# Patient Record
Sex: MALE | Race: OTHER | HISPANIC OR LATINO
[De-identification: names, ages, dates, MRNs, and addresses within clinical notes are randomized per-mention and may not be internally consistent; named-entity substitution may affect disease eponyms.]

---

## 2021-03-16 PROBLEM — Z00.00 ENCOUNTER FOR PREVENTIVE HEALTH EXAMINATION: Status: ACTIVE | Noted: 2021-03-16

## 2021-03-23 ENCOUNTER — APPOINTMENT (OUTPATIENT)
Dept: UROLOGY | Facility: CLINIC | Age: 71
End: 2021-03-23
Payer: MEDICARE

## 2021-03-23 VITALS
BODY MASS INDEX: 24.62 KG/M2 | HEIGHT: 70 IN | SYSTOLIC BLOOD PRESSURE: 116 MMHG | TEMPERATURE: 97.2 F | DIASTOLIC BLOOD PRESSURE: 82 MMHG | HEART RATE: 91 BPM | WEIGHT: 172 LBS

## 2021-03-23 PROCEDURE — 99203 OFFICE O/P NEW LOW 30 MIN: CPT

## 2021-03-23 RX ORDER — SENNOSIDES 8.6 MG TABLETS 8.6 MG/1
TABLET ORAL
Refills: 0 | Status: ACTIVE | COMMUNITY

## 2021-03-23 RX ORDER — ATORVASTATIN CALCIUM 40 MG/1
40 TABLET, FILM COATED ORAL
Refills: 0 | Status: ACTIVE | COMMUNITY

## 2021-03-23 RX ORDER — METFORMIN HYDROCHLORIDE 500 MG/1
500 TABLET, COATED ORAL
Refills: 0 | Status: ACTIVE | COMMUNITY

## 2021-03-23 RX ORDER — OMEPRAZOLE 20 MG/1
20 CAPSULE, DELAYED RELEASE ORAL
Refills: 0 | Status: ACTIVE | COMMUNITY

## 2021-03-23 RX ORDER — ASPIRIN 81 MG
81 TABLET,CHEWABLE ORAL
Refills: 0 | Status: ACTIVE | COMMUNITY

## 2021-03-23 RX ORDER — IBUPROFEN 600 MG/1
600 TABLET, FILM COATED ORAL
Refills: 0 | Status: ACTIVE | COMMUNITY

## 2021-03-23 NOTE — HISTORY OF PRESENT ILLNESS
[FreeTextEntry1] : This is a 70 year old male recently diagnosed with GS 7 prostate cancer as per the MRI report.  No pathology report available today.\par Diagnosed with GS 7 (3+4) with a PSA of 8.8.  Was diagnosed with CaP by Dr. Boyd at [facility] and then was going to have surgery with Dr. Cast.\par \par MRI with two PIRADs 4 lesion, left posterolateral midgland and right posteromedial midgland.  Right lesion abuts the capsule but with no EPE visualized\par No pelvic lymphadenopathy or bone lesions\par \par No urinary problkems\par \par \par Medical Hx: DM 2, GERD, High cholesterol\par Surgical Hx:hernia repair\par Social Hx: Former smoker, weekly ETOH use, \par Family Hx: No CaP history

## 2021-04-06 ENCOUNTER — RESULT REVIEW (OUTPATIENT)
Age: 71
End: 2021-04-06

## 2021-04-06 ENCOUNTER — OUTPATIENT (OUTPATIENT)
Dept: OUTPATIENT SERVICES | Facility: HOSPITAL | Age: 71
LOS: 1 days | End: 2021-04-06
Payer: MEDICARE

## 2021-04-06 DIAGNOSIS — C61 MALIGNANT NEOPLASM OF PROSTATE: ICD-10-CM

## 2021-04-06 PROCEDURE — 88321 CONSLTJ&REPRT SLD PREP ELSWR: CPT

## 2021-04-08 LAB — SURGICAL PATHOLOGY STUDY: SIGNIFICANT CHANGE UP

## 2021-04-13 ENCOUNTER — APPOINTMENT (OUTPATIENT)
Dept: UROLOGY | Facility: CLINIC | Age: 71
End: 2021-04-13
Payer: MEDICARE

## 2021-04-13 VITALS — DIASTOLIC BLOOD PRESSURE: 77 MMHG | SYSTOLIC BLOOD PRESSURE: 121 MMHG | TEMPERATURE: 98.2 F | OXYGEN SATURATION: 98 %

## 2021-04-13 PROCEDURE — 99214 OFFICE O/P EST MOD 30 MIN: CPT

## 2021-04-13 NOTE — HISTORY OF PRESENT ILLNESS
[FreeTextEntry1] : 70 year old male with recent diagnosis of prostate cancer. UNderwent prostate biopsy back in January 2021 for a PSA of 8.8.\par Was being followed at [facility] and was going to undergo Surgery with Dr. Cast but presented to us for a second opinion.\par \par MRI with two PIRADs 4 lesion, left posterolateral midgland and right posteromedial midgland. Right lesion abuts the capsule but with no EPE visualized\par No pelvic lymphadenopathy or bone lesions\par \par \par Slides reviewed by [facility] pathology \par GS 6 right medial base\par GS 7 right mid lateral\par S 7 right mid medial\par GS 6 Right Courtenay lateral\par GS 7 right apex medial\par

## 2021-04-13 NOTE — ASSESSMENT
[FreeTextEntry1] : GS 7\par \par Today we discussed the natural history of localized prostate cancer, and the heterogeneous biology of this malignancy.  We discussed the fact that many prostate cancers are slow growing and unlikely to metastasize or cause death, whereas others can be life threatening.  We reviewed risk stratification, staging, Sonia scoring, and PSA levels as they pertain to risk.  \par \par All treatment options were reviewed.  This included active surveillance, androgen deprivation, emerging options such as focal therapy/HIFU/cryotherapy, radiation options (including IMRT, SBRT, brachytherapy) and surgical options (open vs. robotic surgery, nerve vs. non-nerve sparing).  Oncologic outcomes were compared and contrasted.  Risks of biochemical and clinical recurrence discussed.  Risks of needing adjuvant or salvage treatments reviewed.  We discussed the risks of secondary malignancy after radiation.  We discussed the opportunity for pathological staging with surgery vs. other options.  We discussed the possibility of positive margins, treatment failure, cancer recurrence and cancer-related death even with treatment. \par \par All potential side effects of treatment were reviewed including, but not limited to: short term or permanent stress urinary incontinence and/or short term or permanent erectile dysfunction, penile shortening, rectal symptoms/pain, perineal pain, and other side effects. \par \par All potential complications of treatment and surgery were reviewed including, but not limited to: risks of conversion from MIS to open surgery discussed, bleeding//life-threatening hemorrhage, rectal injury requiring colostomy or delayed recognition leading to fistula, vascular/bowel/adjacent visceral organ injury, trocar/access injury, the possibility of recognized vs. unrecognized/delayed-recognition injury, risks of thermal/blunt/sharp/retraction injury, risks of DVT, PE, MI, death, risks of cardiopulmonary/anesthesia related complications, positional injury, infection/collection/abscess, wound complications/dehiscence/seroma/cellulitis, urinoma/fistula, ureteral injury/obstruction, bladder neck contracture, penile shortening, meatal stenosis, urethral stricture, lymphocele, obturator nerve injury, prolonged anastomotic leak, and other complications. \par \par \par \par \par \par \par \par

## 2021-05-07 VITALS
OXYGEN SATURATION: 98 % | WEIGHT: 175.71 LBS | RESPIRATION RATE: 16 BRPM | TEMPERATURE: 98 F | DIASTOLIC BLOOD PRESSURE: 72 MMHG | HEIGHT: 70 IN | SYSTOLIC BLOOD PRESSURE: 115 MMHG | HEART RATE: 72 BPM

## 2021-05-10 ENCOUNTER — RESULT REVIEW (OUTPATIENT)
Age: 71
End: 2021-05-10

## 2021-05-10 ENCOUNTER — APPOINTMENT (OUTPATIENT)
Dept: UROLOGY | Facility: HOSPITAL | Age: 71
End: 2021-05-10

## 2021-05-10 ENCOUNTER — INPATIENT (INPATIENT)
Facility: HOSPITAL | Age: 71
LOS: 0 days | Discharge: HOME | DRG: 708 | End: 2021-05-11
Attending: UROLOGY | Admitting: UROLOGY
Payer: MEDICARE

## 2021-05-10 DIAGNOSIS — C61 MALIGNANT NEOPLASM OF PROSTATE: ICD-10-CM

## 2021-05-10 DIAGNOSIS — E78.5 HYPERLIPIDEMIA, UNSPECIFIED: ICD-10-CM

## 2021-05-10 DIAGNOSIS — Z98.890 OTHER SPECIFIED POSTPROCEDURAL STATES: Chronic | ICD-10-CM

## 2021-05-10 DIAGNOSIS — R47.1 DYSARTHRIA AND ANARTHRIA: ICD-10-CM

## 2021-05-10 DIAGNOSIS — R29.810 FACIAL WEAKNESS: ICD-10-CM

## 2021-05-10 DIAGNOSIS — E11.9 TYPE 2 DIABETES MELLITUS WITHOUT COMPLICATIONS: ICD-10-CM

## 2021-05-10 LAB
ANION GAP SERPL CALC-SCNC: 8 MMOL/L — SIGNIFICANT CHANGE UP (ref 5–17)
BUN SERPL-MCNC: 11 MG/DL — SIGNIFICANT CHANGE UP (ref 7–23)
CALCIUM SERPL-MCNC: 8.8 MG/DL — SIGNIFICANT CHANGE UP (ref 8.4–10.5)
CHLORIDE SERPL-SCNC: 107 MMOL/L — SIGNIFICANT CHANGE UP (ref 96–108)
CO2 SERPL-SCNC: 24 MMOL/L — SIGNIFICANT CHANGE UP (ref 22–31)
CREAT SERPL-MCNC: 0.77 MG/DL — SIGNIFICANT CHANGE UP (ref 0.5–1.3)
GLUCOSE BLDC GLUCOMTR-MCNC: 136 MG/DL — HIGH (ref 70–99)
GLUCOSE BLDC GLUCOMTR-MCNC: 180 MG/DL — HIGH (ref 70–99)
GLUCOSE BLDC GLUCOMTR-MCNC: 184 MG/DL — HIGH (ref 70–99)
GLUCOSE BLDC GLUCOMTR-MCNC: 189 MG/DL — HIGH (ref 70–99)
GLUCOSE BLDC GLUCOMTR-MCNC: 214 MG/DL — HIGH (ref 70–99)
GLUCOSE SERPL-MCNC: 198 MG/DL — HIGH (ref 70–99)
HCT VFR BLD CALC: 30.5 % — LOW (ref 39–50)
HGB BLD-MCNC: 9.6 G/DL — LOW (ref 13–17)
MCHC RBC-ENTMCNC: 28.1 PG — SIGNIFICANT CHANGE UP (ref 27–34)
MCHC RBC-ENTMCNC: 31.5 GM/DL — LOW (ref 32–36)
MCV RBC AUTO: 89.2 FL — SIGNIFICANT CHANGE UP (ref 80–100)
NRBC # BLD: 0 /100 WBCS — SIGNIFICANT CHANGE UP (ref 0–0)
PLATELET # BLD AUTO: 198 K/UL — SIGNIFICANT CHANGE UP (ref 150–400)
POTASSIUM SERPL-MCNC: 5.1 MMOL/L — SIGNIFICANT CHANGE UP (ref 3.5–5.3)
POTASSIUM SERPL-SCNC: 5.1 MMOL/L — SIGNIFICANT CHANGE UP (ref 3.5–5.3)
RBC # BLD: 3.42 M/UL — LOW (ref 4.2–5.8)
RBC # FLD: 15.5 % — HIGH (ref 10.3–14.5)
SARS-COV-2 N GENE NPH QL NAA+PROBE: NOT DETECTED
SODIUM SERPL-SCNC: 139 MMOL/L — SIGNIFICANT CHANGE UP (ref 135–145)
WBC # BLD: 8.1 K/UL — SIGNIFICANT CHANGE UP (ref 3.8–10.5)
WBC # FLD AUTO: 8.1 K/UL — SIGNIFICANT CHANGE UP (ref 3.8–10.5)

## 2021-05-10 PROCEDURE — 88305 TISSUE EXAM BY PATHOLOGIST: CPT | Mod: 26

## 2021-05-10 PROCEDURE — 55866 LAPS SURG PRST8ECT RPBIC RAD: CPT

## 2021-05-10 PROCEDURE — 99222 1ST HOSP IP/OBS MODERATE 55: CPT

## 2021-05-10 PROCEDURE — 38571 LAPAROSCOPY LYMPHADENECTOMY: CPT

## 2021-05-10 PROCEDURE — 88309 TISSUE EXAM BY PATHOLOGIST: CPT | Mod: 26

## 2021-05-10 PROCEDURE — 70450 CT HEAD/BRAIN W/O DYE: CPT | Mod: 26,59

## 2021-05-10 PROCEDURE — 70498 CT ANGIOGRAPHY NECK: CPT | Mod: 26

## 2021-05-10 PROCEDURE — 0042T: CPT

## 2021-05-10 PROCEDURE — 70496 CT ANGIOGRAPHY HEAD: CPT | Mod: 26

## 2021-05-10 RX ORDER — GLUCAGON INJECTION, SOLUTION 0.5 MG/.1ML
1 INJECTION, SOLUTION SUBCUTANEOUS ONCE
Refills: 0 | Status: DISCONTINUED | OUTPATIENT
Start: 2021-05-10 | End: 2021-05-11

## 2021-05-10 RX ORDER — SODIUM CHLORIDE 9 MG/ML
1000 INJECTION INTRAMUSCULAR; INTRAVENOUS; SUBCUTANEOUS
Refills: 0 | Status: DISCONTINUED | OUTPATIENT
Start: 2021-05-10 | End: 2021-05-11

## 2021-05-10 RX ORDER — IBUPROFEN 200 MG
0 TABLET ORAL
Qty: 0 | Refills: 0 | DISCHARGE

## 2021-05-10 RX ORDER — ENOXAPARIN SODIUM 100 MG/ML
40 INJECTION SUBCUTANEOUS ONCE
Refills: 0 | Status: COMPLETED | OUTPATIENT
Start: 2021-05-10 | End: 2021-05-10

## 2021-05-10 RX ORDER — OXYCODONE HYDROCHLORIDE 5 MG/1
5 TABLET ORAL EVERY 6 HOURS
Refills: 0 | Status: DISCONTINUED | OUTPATIENT
Start: 2021-05-10 | End: 2021-05-11

## 2021-05-10 RX ORDER — SODIUM CHLORIDE 9 MG/ML
1000 INJECTION, SOLUTION INTRAVENOUS
Refills: 0 | Status: DISCONTINUED | OUTPATIENT
Start: 2021-05-10 | End: 2021-05-11

## 2021-05-10 RX ORDER — ACETAMINOPHEN 500 MG
1000 TABLET ORAL ONCE
Refills: 0 | Status: COMPLETED | OUTPATIENT
Start: 2021-05-10 | End: 2021-05-10

## 2021-05-10 RX ORDER — ENOXAPARIN SODIUM 100 MG/ML
40 INJECTION SUBCUTANEOUS EVERY 24 HOURS
Refills: 0 | Status: DISCONTINUED | OUTPATIENT
Start: 2021-05-11 | End: 2021-05-11

## 2021-05-10 RX ORDER — DEXTROSE 50 % IN WATER 50 %
25 SYRINGE (ML) INTRAVENOUS ONCE
Refills: 0 | Status: DISCONTINUED | OUTPATIENT
Start: 2021-05-10 | End: 2021-05-11

## 2021-05-10 RX ORDER — INSULIN LISPRO 100/ML
VIAL (ML) SUBCUTANEOUS
Refills: 0 | Status: DISCONTINUED | OUTPATIENT
Start: 2021-05-10 | End: 2021-05-11

## 2021-05-10 RX ORDER — DEXTROSE 50 % IN WATER 50 %
12.5 SYRINGE (ML) INTRAVENOUS ONCE
Refills: 0 | Status: DISCONTINUED | OUTPATIENT
Start: 2021-05-10 | End: 2021-05-11

## 2021-05-10 RX ORDER — GABAPENTIN 400 MG/1
600 CAPSULE ORAL ONCE
Refills: 0 | Status: COMPLETED | OUTPATIENT
Start: 2021-05-10 | End: 2021-05-10

## 2021-05-10 RX ORDER — SENNA PLUS 8.6 MG/1
2 TABLET ORAL AT BEDTIME
Refills: 0 | Status: DISCONTINUED | OUTPATIENT
Start: 2021-05-10 | End: 2021-05-11

## 2021-05-10 RX ORDER — BUPIVACAINE 13.3 MG/ML
20 INJECTION, SUSPENSION, LIPOSOMAL INFILTRATION ONCE
Refills: 0 | Status: DISCONTINUED | OUTPATIENT
Start: 2021-05-10 | End: 2021-05-11

## 2021-05-10 RX ORDER — ONDANSETRON 8 MG/1
4 TABLET, FILM COATED ORAL EVERY 6 HOURS
Refills: 0 | Status: DISCONTINUED | OUTPATIENT
Start: 2021-05-10 | End: 2021-05-11

## 2021-05-10 RX ORDER — DEXTROSE 50 % IN WATER 50 %
15 SYRINGE (ML) INTRAVENOUS ONCE
Refills: 0 | Status: DISCONTINUED | OUTPATIENT
Start: 2021-05-10 | End: 2021-05-11

## 2021-05-10 RX ORDER — CEFAZOLIN SODIUM 1 G
500 VIAL (EA) INJECTION EVERY 8 HOURS
Refills: 0 | Status: DISCONTINUED | OUTPATIENT
Start: 2021-05-10 | End: 2021-05-10

## 2021-05-10 RX ORDER — CEFAZOLIN SODIUM 1 G
2000 VIAL (EA) INJECTION EVERY 8 HOURS
Refills: 0 | Status: COMPLETED | OUTPATIENT
Start: 2021-05-10 | End: 2021-05-11

## 2021-05-10 RX ORDER — ACETAMINOPHEN 500 MG
650 TABLET ORAL EVERY 6 HOURS
Refills: 0 | Status: DISCONTINUED | OUTPATIENT
Start: 2021-05-10 | End: 2021-05-11

## 2021-05-10 RX ORDER — OXYBUTYNIN CHLORIDE 5 MG
5 TABLET ORAL EVERY 8 HOURS
Refills: 0 | Status: DISCONTINUED | OUTPATIENT
Start: 2021-05-10 | End: 2021-05-11

## 2021-05-10 RX ORDER — ATORVASTATIN CALCIUM 80 MG/1
40 TABLET, FILM COATED ORAL AT BEDTIME
Refills: 0 | Status: DISCONTINUED | OUTPATIENT
Start: 2021-05-10 | End: 2021-05-11

## 2021-05-10 RX ADMIN — OXYCODONE HYDROCHLORIDE 5 MILLIGRAM(S): 5 TABLET ORAL at 13:35

## 2021-05-10 RX ADMIN — ATORVASTATIN CALCIUM 40 MILLIGRAM(S): 80 TABLET, FILM COATED ORAL at 21:20

## 2021-05-10 RX ADMIN — ENOXAPARIN SODIUM 40 MILLIGRAM(S): 100 INJECTION SUBCUTANEOUS at 07:03

## 2021-05-10 RX ADMIN — Medication 5 MILLIGRAM(S): at 12:51

## 2021-05-10 RX ADMIN — SENNA PLUS 2 TABLET(S): 8.6 TABLET ORAL at 21:20

## 2021-05-10 RX ADMIN — Medication 5 MILLIGRAM(S): at 21:20

## 2021-05-10 RX ADMIN — Medication 650 MILLIGRAM(S): at 17:45

## 2021-05-10 RX ADMIN — Medication 2000 MILLIGRAM(S): at 16:30

## 2021-05-10 RX ADMIN — OXYCODONE HYDROCHLORIDE 5 MILLIGRAM(S): 5 TABLET ORAL at 22:20

## 2021-05-10 RX ADMIN — OXYCODONE HYDROCHLORIDE 5 MILLIGRAM(S): 5 TABLET ORAL at 21:20

## 2021-05-10 RX ADMIN — Medication 650 MILLIGRAM(S): at 18:30

## 2021-05-10 RX ADMIN — OXYCODONE HYDROCHLORIDE 5 MILLIGRAM(S): 5 TABLET ORAL at 13:19

## 2021-05-10 RX ADMIN — Medication 1: at 21:32

## 2021-05-10 RX ADMIN — Medication 1000 MILLIGRAM(S): at 07:02

## 2021-05-10 RX ADMIN — Medication 650 MILLIGRAM(S): at 23:07

## 2021-05-10 RX ADMIN — GABAPENTIN 600 MILLIGRAM(S): 400 CAPSULE ORAL at 07:03

## 2021-05-10 NOTE — CONSULT NOTE ADULT - ASSESSMENT
70y Male with PMHx of HLD, DM, prostate cancer, hernia s/p repair, and heartburn presented to [facility] for elective single port robotic radical prostatectomy. Stroke code called by floor RN for right facial droop and dysarthria. NIHSS 1 for dysarthria. Patient and outpatient Urology NP (saw pt prior to admission) confirmed patient is at baseline neuro exam. HCT showed no acute infarct or hemorrhage. CTA was negative for large vessel occlusion or stenosis. Patient with no clinical signs of stroke, at neurologic baseline.     - no need for further inpatient stroke workup or outpatient follow up  - Thank you for allowing us to participate in the care of this patient, please call Neurology with questions as needed.  70y Male with PMHx of HLD, DM, prostate cancer, hernia s/p repair, and heartburn presented to [facility] for elective single port robotic radical prostatectomy. Stroke code called by floor RN for right facial droop and dysarthria. NIHSS 1 for dysarthria. Patient and outpatient Urology NP (saw pt prior to admission) confirmed patient is at baseline neuro exam. HCT showed no acute infarct or hemorrhage. CTA was negative for large vessel occlusion or stenosis. Patient with no clinical signs of stroke, at neurologic baseline.     - no need for further inpatient stroke workup or outpatient follow up  - for primary stroke prevention can consider asa 81mg and start statin for LDL goal <100, follow up with PCP  - Thank you for allowing us to participate in the care of this patient, please call Neurology with questions as needed.  70y Male with PMHx of HLD, DM, prostate cancer, hernia s/p repair, and heartburn presented to [facility] for elective single port robotic radical prostatectomy. Stroke code called by floor RN for right facial droop and dysarthria. NIHSS 1 for dysarthria. Patient and outpatient Urology NP (saw pt prior to admission) confirmed patient is at baseline neuro exam. HCT showed no acute infarct or hemorrhage. CTA was negative for large vessel occlusion or stenosis. Patient with no clinical signs of stroke, at neurologic baseline.     - no need for further inpatient stroke workup or outpatient follow up  - for primary stroke prevention can consider asa 81mg and start statin for LDL goal <100, follow up with PCP (given mild-moderate atherosclerosis on CTA)  - Thank you for allowing us to participate in the care of this patient, please call Neurology with questions as needed.

## 2021-05-10 NOTE — PROGRESS NOTE ADULT - SUBJECTIVE AND OBJECTIVE BOX
UROLOGY POST OP NOTE (PAGER # [phone])    PROCEDURE: RALP    T(C): 37.2 (05-10-21 @ 16:19), Max: 37.3 (05-10-21 @ 14:05)  HR: 90 (05-10-21 @ 16:19) (66 - 90)  BP: 135/80 (05-10-21 @ 16:19) (135/80 - 174/95)  RR: 18 (05-10-21 @ 16:19) (14 - 19)  SpO2: 97% (05-10-21 @ 16:19) (93% - 100%)  Wt(kg): --  UO: adequate    SUBJECTIVE: c/o some bladder spasms. No N/V. No CP/SOB. Stroke code called after patient arrived on floor from pacu. Noted to have ?new R sided facial droop and dysarthria. CT head was unremarkable.    ON PE: alert and awake    Abdomen: soft, incision site clean and dry, NT    : FC intact, urine blood tinged                          9.6    8.10  )-----------( 198      ( 10 May 2021 12:43 )             30.5     05-10    139  |  107  |  11  ----------------------------<  198<H>  5.1   |  24  |  0.77    Ca    8.8      10 May 2021 12:43        < from: CT Angio Neck w/ IV Cont (05.10.21 @ 16:41) >    FINDINGS:    The aortic arch is normal size and shows patency, with normal 3 vessel configuration.    Both common carotid arteriesare patent up to the bifurcations.    The right internal carotid artery is patent up to the skull base, without hemodynamically significant stenosis or occlusion.    There is predominantly noncalcified plaque of the left carotid bifurcation and proximal left internal carotid artery resulting in mild (less than 50%) stenosis per NASCET criteria.    There is calcified plaque of the right vertebral artery origin resulting in moderate stenosis. There is contrast filling the remainder of the cervical right vertebral artery without significant stenosis. There is contrast filling of the cervical left vertebral artery without significant stenosis.    IMPRESSION: Mild stenosis of the proximal left internal carotid artery per NASCET criteria. Moderate stenosis of the right vertebral artery origin.      < end of copied text >  < from: CT Brain Stroke Protocol (05.10.21 @ 16:06) >  The ventricles and sulci are within normal limits for patient's stated age.    No evidence for intracranial hemorrhage, significant space occupying process or recent territorial infarction.  No acute extra-axial fluid collections are identified.    Gray-white matter differentiation is preserved.    The bones of thecalvarium are intact.    There is fluid within the paranasal sinuses which may be secondary to..    Impression:    No acute intracranial hemorrhage, mass effect or acute demarcated territorial infarction..      The last image was acquired on 5/10/2021 at 3:45 PM and the findings were discussed with CANDIDO Nguyen by Dr. Albino White     < end of copied text >

## 2021-05-10 NOTE — PROVIDER CONTACT NOTE (CHANGE IN STATUS NOTIFICATION) - SITUATION
Patient has visible right face drooping and slurred speech. Patient complaining of eye discomfort and cannot keep them open. Patient very lethargic - stroke code called

## 2021-05-10 NOTE — PROVIDER CONTACT NOTE (CHANGE IN STATUS NOTIFICATION) - BACKGROUND
Patient is s/p radical prostatectomy 5/10. Patient arrived on unit at 14:30 and had visible droop. Patient had quick neuro check and muscle strength testing at bedside. Patient stated droop is due to missing dentures to that side of his mouth. Dentures applied and droop resolved. 15:30 - nursing assistance patient would call but was confused when asked what was needed.  patient was assessed and was noted droop had returned. Patient continued to complain about eye discomfort and was unable to keep eyes open for long. Patient was able to follow directions but speech was slightly slurred.

## 2021-05-10 NOTE — PROVIDER CONTACT NOTE (OTHER) - ASSESSMENT
patient arterial line BP is 185/ 58. Patient c/o pain and sensation to urinate and urge to pass bowel movement

## 2021-05-10 NOTE — CONSULT NOTE ADULT - ATTENDING COMMENTS
Pt is at baseline per OP-NP, incidentally found atherosclerosis on CTA h/n:   -aspirin 81 daily, OP vascular risk factor mgt (goal BP<120/80 with ACEI+/-CCB’s, LDL bet 50 & 70, A1C<7.0)

## 2021-05-10 NOTE — PROVIDER CONTACT NOTE (CHANGE IN STATUS NOTIFICATION) - ASSESSMENT
Stroke code called. /90, HR 95, 98.3 TEMP, 95% ON ROOM air. Rectal temp. 99.4 blood glucose 214. PACU nurses arrived to unit per stroke team request to assess if patient has a change compared to when cared for in PACU. Per PACU nurses, patient did not have any droop.

## 2021-05-10 NOTE — CONSULT NOTE ADULT - SUBJECTIVE AND OBJECTIVE BOX
**STROKE CODE CONSULT NOTE**    Last known well time/Time of onset of symptoms: 1400 5/10/2021    HPI: 70y Male with PMHx of HLD, DM, prostate cancer, hernia s/p repair, and heartburn presented to [facility] for elective single port robotic radical prostatectomy using Kwan technique. Patient was seen by outpatient Urology NP pre-op. NP endorses patient was dysarthric and hard of hearing at baseline. Patient was first case of the day 7am and left OR around 12pm where he was recovered in the PACU. PACU RNs state patient was talking, moving, and able to drink water, with mild dysarthria at 2pm. Patient then transferred to floor around 2:30pm where floor RN noticed right facial droop and dysarthria, stroke code called. NIHSS 1 for dysarthria. Patient with normal variation of symmetry in face, confirmed by patient. Patient states that his voice sounds the same, just a little more hoarse. HCT showed no acute infarct or hemorrhage. CTA was negative for large vessel occlusion or stenosis. Outpatient Urology NP was able to come and see the patient and endorsed patient's face and speech were at baseline. Patient able to ambulate well on exam, MRS was previous 0. Patient denies acute onset of numbness, weakness, tingling, slurred speech, blurry vision, double vision, dizziness, headache.     T(C): 37.2 (05-10-21 @ 16:19), Max: 37.3 (05-10-21 @ 14:05)  HR: 90 (05-10-21 @ 16:19) (66 - 90)  BP: 135/80 (05-10-21 @ 16:19) (135/80 - 174/95)  RR: 18 (05-10-21 @ 16:19) (14 - 19)  SpO2: 97% (05-10-21 @ 16:19) (93% - 100%)    PAST MEDICAL & SURGICAL HISTORY:  DM (diabetes mellitus)    Hyperlipidemia    Prostate cancer    Hernia    Heartburn    History of hernia repair        FAMILY HISTORY:      SOCIAL HISTORY:      MEDICATIONS  (STANDING):  acetaminophen   Tablet .. 650 milliGRAM(s) Oral every 6 hours  atorvastatin 40 milliGRAM(s) Oral at bedtime  BUpivacaine liposome 1.3% Injectable (no eMAR) 20 milliLiter(s) Local Injection once  ceFAZolin  Injectable. 2000 milliGRAM(s) IV Push every 8 hours  dextrose 40% Gel 15 Gram(s) Oral once  dextrose 5%. 1000 milliLiter(s) (50 mL/Hr) IV Continuous <Continuous>  dextrose 5%. 1000 milliLiter(s) (100 mL/Hr) IV Continuous <Continuous>  dextrose 50% Injectable 25 Gram(s) IV Push once  dextrose 50% Injectable 12.5 Gram(s) IV Push once  dextrose 50% Injectable 25 Gram(s) IV Push once  glucagon  Injectable 1 milliGRAM(s) IntraMuscular once  insulin lispro (ADMELOG) corrective regimen sliding scale   SubCutaneous Before meals and at bedtime  sodium chloride 0.9%. 1000 milliLiter(s) (120 mL/Hr) IV Continuous <Continuous>    MEDICATIONS  (PRN):  ondansetron Injectable 4 milliGRAM(s) IV Push every 6 hours PRN Nausea  oxybutynin 5 milliGRAM(s) Oral every 8 hours PRN Bladder spasms  oxyCODONE    IR 5 milliGRAM(s) Oral every 6 hours PRN Severe Pain (7 - 10)    Allergies    No Known Allergies    Intolerances      Vital Signs Last 24 Hrs  T(C): 37.2 (10 May 2021 16:19), Max: 37.3 (10 May 2021 14:05)  T(F): 98.9 (10 May 2021 16:19), Max: 99.1 (10 May 2021 14:05)  HR: 90 (10 May 2021 16:19) (66 - 90)  BP: 135/80 (10 May 2021 16:19) (135/80 - 174/95)  BP(mean): 119 (10 May 2021 14:05) (105 - 128)  RR: 18 (10 May 2021 16:19) (14 - 19)  SpO2: 97% (10 May 2021 16:19) (93% - 100%)    Physical exam:  Constitutional: No acute distress, conversant  Neurologic:  -Mental status: Awake, alert, oriented to person, place, and time. Speech is fluent with intact naming, repetition, and comprehension, mild dysarthria. Recent and remote memory intact. Follows commands. Attention/concentration intact. Fund of knowledge appropriate.  -Cranial nerves:   II: Visual fields are full to confrontation.  III, IV, VI: Extraocular movements are intact without nystagmus. Pupils equally round and reactive to light  V:  Facial sensation V1-V3 equal and intact   VII: Face is symmetric with normal eye closure and smile  V111: Hard of hearing bilaterally  Motor: Normal bulk and tone. No pronator drift. Strength bilateral upper extremity 5/5, bilateral lower extremities 5/5.  Sensation: Intact to light touch bilaterally. No neglect or extinction on double simultaneous testing.  Coordination: No dysmetria on finger-to-nose and heel-to-shin bilaterally  Reflexes: Downgoing toes bilaterally   Gait: Narrow gait and steady    NIHSS: 1 ASPECT Score: 10    Fingerstick Blood Glucose: CAPILLARY BLOOD GLUCOSE      POCT Blood Glucose.: 214 mg/dL (10 May 2021 15:26)    LABS:                        9.6    8.10  )-----------( 198      ( 10 May 2021 12:43 )             30.5     05-10    139  |  107  |  11  ----------------------------<  198<H>  5.1   |  24  |  0.77    Ca    8.8      10 May 2021 12:43                RADIOLOGY & ADDITIONAL STUDIES:  < from: CT Angio Head w/ IV Cont (05.10.21 @ 16:41) >  IMPRESSION: No large vessel occlusion. Diminutive caliber of the basilar artery with prominent bilateral posterior communicating artery supplying the posterior cerebral arteries which may be on a congenital basis.    < from: CT Angio Head w/ IV Cont (05.10.21 @ 16:41) >  IMPRESSION: Mild stenosis of the proximal left internal carotid artery per NASCET criteria. Moderate stenosis of the right vertebral artery origin.    < from: CT Brain Stroke Protocol (05.10.21 @ 16:06) >  Impression:    No acute intracranial hemorrhage, mass effect or acute demarcated territorial infarction..    < end of copied text >      -----------------------------------------------------------------------------------------------------------------  IV-tPA (Y/N):   no, patient at baseline neuro symptoms

## 2021-05-11 ENCOUNTER — TRANSCRIPTION ENCOUNTER (OUTPATIENT)
Age: 71
End: 2021-05-11

## 2021-05-11 VITALS
DIASTOLIC BLOOD PRESSURE: 67 MMHG | HEART RATE: 63 BPM | OXYGEN SATURATION: 97 % | RESPIRATION RATE: 17 BRPM | SYSTOLIC BLOOD PRESSURE: 107 MMHG | TEMPERATURE: 98 F

## 2021-05-11 DIAGNOSIS — E78.5 HYPERLIPIDEMIA, UNSPECIFIED: ICD-10-CM

## 2021-05-11 DIAGNOSIS — E11.9 TYPE 2 DIABETES MELLITUS WITHOUT COMPLICATIONS: ICD-10-CM

## 2021-05-11 LAB
A1C WITH ESTIMATED AVERAGE GLUCOSE RESULT: 7.5 % — HIGH (ref 4–5.6)
ANION GAP SERPL CALC-SCNC: 8 MMOL/L — SIGNIFICANT CHANGE UP (ref 5–17)
BUN SERPL-MCNC: 10 MG/DL — SIGNIFICANT CHANGE UP (ref 7–23)
CALCIUM SERPL-MCNC: 8.2 MG/DL — LOW (ref 8.4–10.5)
CHLORIDE SERPL-SCNC: 103 MMOL/L — SIGNIFICANT CHANGE UP (ref 96–108)
CO2 SERPL-SCNC: 29 MMOL/L — SIGNIFICANT CHANGE UP (ref 22–31)
COVID-19 SPIKE DOMAIN AB INTERP: NEGATIVE — SIGNIFICANT CHANGE UP
COVID-19 SPIKE DOMAIN ANTIBODY RESULT: 0.4 U/ML — SIGNIFICANT CHANGE UP
CREAT SERPL-MCNC: 0.89 MG/DL — SIGNIFICANT CHANGE UP (ref 0.5–1.3)
ESTIMATED AVERAGE GLUCOSE: 169 MG/DL — HIGH (ref 68–114)
GLUCOSE BLDC GLUCOMTR-MCNC: 151 MG/DL — HIGH (ref 70–99)
GLUCOSE BLDC GLUCOMTR-MCNC: 214 MG/DL — HIGH (ref 70–99)
GLUCOSE BLDC GLUCOMTR-MCNC: 214 MG/DL — HIGH (ref 70–99)
GLUCOSE SERPL-MCNC: 139 MG/DL — HIGH (ref 70–99)
HCT VFR BLD CALC: 27.4 % — LOW (ref 39–50)
HGB BLD-MCNC: 8.4 G/DL — LOW (ref 13–17)
MAGNESIUM SERPL-MCNC: 1.9 MG/DL — SIGNIFICANT CHANGE UP (ref 1.6–2.6)
MCHC RBC-ENTMCNC: 27.7 PG — SIGNIFICANT CHANGE UP (ref 27–34)
MCHC RBC-ENTMCNC: 30.7 GM/DL — LOW (ref 32–36)
MCV RBC AUTO: 90.4 FL — SIGNIFICANT CHANGE UP (ref 80–100)
NRBC # BLD: 0 /100 WBCS — SIGNIFICANT CHANGE UP (ref 0–0)
PHOSPHATE SERPL-MCNC: 3.2 MG/DL — SIGNIFICANT CHANGE UP (ref 2.5–4.5)
PLATELET # BLD AUTO: 190 K/UL — SIGNIFICANT CHANGE UP (ref 150–400)
POTASSIUM SERPL-MCNC: 4 MMOL/L — SIGNIFICANT CHANGE UP (ref 3.5–5.3)
POTASSIUM SERPL-SCNC: 4 MMOL/L — SIGNIFICANT CHANGE UP (ref 3.5–5.3)
RBC # BLD: 3.03 M/UL — LOW (ref 4.2–5.8)
RBC # FLD: 16 % — HIGH (ref 10.3–14.5)
SARS-COV-2 IGG+IGM SERPL QL IA: 0.4 U/ML — SIGNIFICANT CHANGE UP
SARS-COV-2 IGG+IGM SERPL QL IA: NEGATIVE — SIGNIFICANT CHANGE UP
SODIUM SERPL-SCNC: 140 MMOL/L — SIGNIFICANT CHANGE UP (ref 135–145)
WBC # BLD: 6.95 K/UL — SIGNIFICANT CHANGE UP (ref 3.8–10.5)
WBC # FLD AUTO: 6.95 K/UL — SIGNIFICANT CHANGE UP (ref 3.8–10.5)

## 2021-05-11 PROCEDURE — 82962 GLUCOSE BLOOD TEST: CPT

## 2021-05-11 PROCEDURE — 86901 BLOOD TYPING SEROLOGIC RH(D): CPT

## 2021-05-11 PROCEDURE — 86769 SARS-COV-2 COVID-19 ANTIBODY: CPT

## 2021-05-11 PROCEDURE — 85027 COMPLETE CBC AUTOMATED: CPT

## 2021-05-11 PROCEDURE — 88305 TISSUE EXAM BY PATHOLOGIST: CPT

## 2021-05-11 PROCEDURE — 86850 RBC ANTIBODY SCREEN: CPT

## 2021-05-11 PROCEDURE — S2900: CPT

## 2021-05-11 PROCEDURE — 83735 ASSAY OF MAGNESIUM: CPT

## 2021-05-11 PROCEDURE — 88309 TISSUE EXAM BY PATHOLOGIST: CPT

## 2021-05-11 PROCEDURE — 36415 COLL VENOUS BLD VENIPUNCTURE: CPT

## 2021-05-11 PROCEDURE — 80048 BASIC METABOLIC PNL TOTAL CA: CPT

## 2021-05-11 PROCEDURE — 0042T: CPT

## 2021-05-11 PROCEDURE — C1889: CPT

## 2021-05-11 PROCEDURE — C9399: CPT

## 2021-05-11 PROCEDURE — 83036 HEMOGLOBIN GLYCOSYLATED A1C: CPT

## 2021-05-11 PROCEDURE — 70450 CT HEAD/BRAIN W/O DYE: CPT

## 2021-05-11 PROCEDURE — 70498 CT ANGIOGRAPHY NECK: CPT

## 2021-05-11 PROCEDURE — 70496 CT ANGIOGRAPHY HEAD: CPT

## 2021-05-11 PROCEDURE — 84100 ASSAY OF PHOSPHORUS: CPT

## 2021-05-11 PROCEDURE — 86900 BLOOD TYPING SEROLOGIC ABO: CPT

## 2021-05-11 RX ORDER — LANOLIN ALCOHOL/MO/W.PET/CERES
5 CREAM (GRAM) TOPICAL AT BEDTIME
Refills: 0 | Status: DISCONTINUED | OUTPATIENT
Start: 2021-05-11 | End: 2021-05-11

## 2021-05-11 RX ORDER — POLYETHYLENE GLYCOL 3350 17 G/17G
17 POWDER, FOR SOLUTION ORAL ONCE
Refills: 0 | Status: COMPLETED | OUTPATIENT
Start: 2021-05-11 | End: 2021-05-11

## 2021-05-11 RX ORDER — KETOROLAC TROMETHAMINE 30 MG/ML
15 SYRINGE (ML) INJECTION ONCE
Refills: 0 | Status: DISCONTINUED | OUTPATIENT
Start: 2021-05-11 | End: 2021-05-11

## 2021-05-11 RX ORDER — ASPIRIN/CALCIUM CARB/MAGNESIUM 324 MG
0 TABLET ORAL
Qty: 0 | Refills: 0 | DISCHARGE

## 2021-05-11 RX ORDER — SENNA PLUS 8.6 MG/1
2 TABLET ORAL
Qty: 6 | Refills: 0
Start: 2021-05-11 | End: 2021-05-13

## 2021-05-11 RX ORDER — DOCUSATE SODIUM 100 MG
1 CAPSULE ORAL
Qty: 28 | Refills: 0
Start: 2021-05-11 | End: 2021-05-24

## 2021-05-11 RX ADMIN — OXYCODONE HYDROCHLORIDE 5 MILLIGRAM(S): 5 TABLET ORAL at 06:49

## 2021-05-11 RX ADMIN — Medication 650 MILLIGRAM(S): at 00:07

## 2021-05-11 RX ADMIN — Medication 5 MILLIGRAM(S): at 05:50

## 2021-05-11 RX ADMIN — Medication 15 MILLIGRAM(S): at 02:44

## 2021-05-11 RX ADMIN — Medication 15 MILLIGRAM(S): at 02:05

## 2021-05-11 RX ADMIN — Medication 650 MILLIGRAM(S): at 06:49

## 2021-05-11 RX ADMIN — Medication 2000 MILLIGRAM(S): at 00:00

## 2021-05-11 RX ADMIN — OXYCODONE HYDROCHLORIDE 5 MILLIGRAM(S): 5 TABLET ORAL at 05:49

## 2021-05-11 RX ADMIN — Medication 1: at 08:23

## 2021-05-11 RX ADMIN — ENOXAPARIN SODIUM 40 MILLIGRAM(S): 100 INJECTION SUBCUTANEOUS at 08:00

## 2021-05-11 RX ADMIN — POLYETHYLENE GLYCOL 3350 17 GRAM(S): 17 POWDER, FOR SOLUTION ORAL at 10:35

## 2021-05-11 RX ADMIN — Medication 5 MILLIGRAM(S): at 02:05

## 2021-05-11 RX ADMIN — Medication 650 MILLIGRAM(S): at 05:49

## 2021-05-11 NOTE — DISCHARGE NOTE PROVIDER - CARE PROVIDER_API CALL
Guanaco Skelton)  Urology  [address] [address]  Phone: [phone]  Fax: [phone]  Follow Up Time:

## 2021-05-11 NOTE — PROGRESS NOTE ADULT - PROBLEM SELECTOR PLAN 1
- s/p RALP  - monitor urine output   - pain control   - OOB/IS   - SCDs   - abx: Ancef   - diet: CLD
-stable  -OOB  -IS, SCD's  -Diet: clears diabetic  -Antibx: ancef x 2  -I's & O's  -pain control  -IVF's  -ISS with insulin coverage as needed

## 2021-05-11 NOTE — PROGRESS NOTE ADULT - ASSESSMENT
71yo male with PMH of hyperlipidemia, DM, GERD, prostate cancer s/p RALP POD#1. 
71 yo male s/p RALP; s/p stroke code, HCT showed no acute infarct or hemorrhage. CTA was negative for large vessel occlusion or stenosis. Patient with no clinical signs of stroke, at neurologic baseline.

## 2021-05-11 NOTE — DISCHARGE NOTE PROVIDER - HOSPITAL COURSE
71 yo male with CaP- s/p RALP single port. Tolerated procedure well. Shortly after patient arrived on floor from PACU stroke code called for new R facial droop and dysarthria.   HCT showed no acute infarct or hemorrhage. CTA was negative for large vessel occlusion or stenosis. Outpatient Urology NP was able to come and see the patient and endorsed patient's face and speech were at baseline. Patient able to ambulate well on exam.   Patient denies acute onset of numbness, weakness, tingling, slurred speech, blurry vision, double vision, dizziness, headache. Patient tolerates PO. To be discharged with ricardo to leg bag. F/U as outpt for ricardo removal.

## 2021-05-11 NOTE — DISCHARGE NOTE PROVIDER - NSDCCPCAREPLAN_GEN_ALL_CORE_FT
PRINCIPAL DISCHARGE DIAGNOSIS  Diagnosis: Prostate CA  Assessment and Plan of Treatment: Continue Diabetic Diet. Stay well hydrated, May shower. Keep dressings clean and Dry, can remove 24hrs after discharge. No strenuous activity until you speak with your Surgeon. Hold any aspirin, or anticoagulation therapy until speaking with your surgeon. Call MD if you have fever >100.4, nausea, vomiting, if ricardo catheter not draining well, or for questions and to set up your follow up appointment. Ricardo catheter/Leg bag care as instructed by nurses.  May resume diabetic medications and high lipid medication.         PRINCIPAL DISCHARGE DIAGNOSIS  Diagnosis: Prostate CA  Assessment and Plan of Treatment: Continue Diabetic Diet. Stay well hydrated, May shower. Keep dressings clean and Dry, can remove 24hrs after discharge. No strenuous activity until you speak with your Surgeon. Hold any aspirin, or anticoagulation therapy until speaking with your surgeon. Call MD if you have fever >100.4, nausea, vomiting, if ricardo catheter not draining well, or for questions and to set up your follow up appointment. Ricardo catheter/Leg bag care as instructed by nurses.  Start antibiotic that was prescribed to you one day prior to follow up appointment with Dr. Skleton and continue for 3 days.   May resume diabetic medications and high lipid medication.  May resume aspirin in 5 days.

## 2021-05-11 NOTE — PROGRESS NOTE ADULT - SUBJECTIVE AND OBJECTIVE BOX
INTERVAL HPI/OVERNIGHT EVENTS:  No acute events overnight.    VITALS:    T(F): 98.7 (05-11-21 @ 00:00), Max: 99.1 (05-10-21 @ 14:05)  HR: 73 (05-11-21 @ 00:00) (66 - 93)  BP: 115/71 (05-11-21 @ 00:00) (115/71 - 174/95)  RR: 17 (05-11-21 @ 00:00) (14 - 19)  SpO2: 93% (05-11-21 @ 00:00) (93% - 100%)  Wt(kg): --    I&O's Detail    10 May 2021 07:01  -  11 May 2021 05:31  --------------------------------------------------------  IN:    Oral Fluid: 570 mL    sodium chloride 0.9%: 1560 mL  Total IN: 2130 mL    OUT:    Indwelling Catheter - Urethral (mL): 2300 mL  Total OUT: 2300 mL    Total NET: -170 mL          MEDICATIONS:    ANTIBIOTICS:      PAIN CONTROL:  acetaminophen   Tablet .. 650 milliGRAM(s) Oral every 6 hours  melatonin 5 milliGRAM(s) Oral at bedtime PRN  ondansetron Injectable 4 milliGRAM(s) IV Push every 6 hours PRN  oxyCODONE    IR 5 milliGRAM(s) Oral every 6 hours PRN       MEDS:  oxybutynin 5 milliGRAM(s) Oral every 8 hours PRN      HEME/ONC  enoxaparin Injectable 40 milliGRAM(s) SubCutaneous every 24 hours        PHYSICAL EXAM:  General: No acute distress.  Alert and Oriented  Abdominal Exam: soft, non-tender, non-distended    Exam: ricardo in place draining light pink urine       LABS:                        9.6    8.10  )-----------( 198      ( 10 May 2021 12:43 )             30.5     05-10    139  |  107  |  11  ----------------------------<  198<H>  5.1   |  24  |  0.77    Ca    8.8      10 May 2021 12:43

## 2021-05-11 NOTE — DISCHARGE NOTE NURSING/CASE MANAGEMENT/SOCIAL WORK - PATIENT PORTAL LINK FT
You can access the FollowMyHealth Patient Portal offered by [facility] by registering at the following website: [email or web address]. By joining [employer]’s FollowMyHealth portal, you will also be able to view your health information using other applications (apps) compatible with our system.

## 2021-05-11 NOTE — DISCHARGE NOTE PROVIDER - NSDCMRMEDTOKEN_GEN_ALL_CORE_FT
atorvastatin 40 mg oral tablet: 1 tab(s) orally once a day  metFORMIN 500 mg oral tablet:   omeprazole 20 mg oral delayed release capsule:   senna 8.6 mg oral tablet: 1 tab(s) orally once a day (at bedtime)  Vitamin D3:    atorvastatin 40 mg oral tablet: 1 tab(s) orally once a day  Augmentin 875 mg-125 mg oral tablet: 1 tab(s) orally every 12 hours MDD:2  To begin one day prior to follow up appointment with Dr. Skelton.  metFORMIN 500 mg oral tablet:   omeprazole 20 mg oral delayed release capsule:   senna 8.6 mg oral tablet: 1 tab(s) orally once a day (at bedtime)  Vitamin D3:

## 2021-05-12 PROBLEM — E78.5 HYPERLIPIDEMIA, UNSPECIFIED: Chronic | Status: ACTIVE | Noted: 2021-05-07

## 2021-05-12 PROBLEM — K46.9 UNSPECIFIED ABDOMINAL HERNIA WITHOUT OBSTRUCTION OR GANGRENE: Chronic | Status: ACTIVE | Noted: 2021-05-07

## 2021-05-12 PROBLEM — R12 HEARTBURN: Chronic | Status: ACTIVE | Noted: 2021-05-07

## 2021-05-12 PROBLEM — E11.9 TYPE 2 DIABETES MELLITUS WITHOUT COMPLICATIONS: Chronic | Status: ACTIVE | Noted: 2021-05-07

## 2021-05-12 PROBLEM — C61 MALIGNANT NEOPLASM OF PROSTATE: Chronic | Status: ACTIVE | Noted: 2021-05-07

## 2021-05-14 LAB — SURGICAL PATHOLOGY STUDY: SIGNIFICANT CHANGE UP

## 2021-05-18 ENCOUNTER — APPOINTMENT (OUTPATIENT)
Dept: UROLOGY | Facility: CLINIC | Age: 71
End: 2021-05-18
Payer: MEDICARE

## 2021-05-18 PROCEDURE — 99024 POSTOP FOLLOW-UP VISIT: CPT

## 2021-05-18 NOTE — HISTORY OF PRESENT ILLNESS
[FreeTextEntry1] : Patient returns 1 week after RALP for catheter removal. \par Patient doing well without N/V/Fever. Urine clear, draining well. \par Tolerating a regular diet, passing gas and BM\par No incisional complaints\par No leg swelling or calf pain, no SOB or CP\par \par Plan is for PSA in 6 weeks\par Patient educated as to performance/importance of Kegel exercises\par \par Pathology reviewed and options discussed (see below)\par

## 2021-05-27 ENCOUNTER — TRANSCRIPTION ENCOUNTER (OUTPATIENT)
Age: 71
End: 2021-05-27

## 2021-07-06 ENCOUNTER — APPOINTMENT (OUTPATIENT)
Dept: UROLOGY | Facility: CLINIC | Age: 71
End: 2021-07-06
Payer: MEDICARE

## 2021-07-06 VITALS — HEART RATE: 99 BPM | SYSTOLIC BLOOD PRESSURE: 117 MMHG | TEMPERATURE: 98.7 F | DIASTOLIC BLOOD PRESSURE: 80 MMHG

## 2021-07-06 DIAGNOSIS — C61 MALIGNANT NEOPLASM OF PROSTATE: ICD-10-CM

## 2021-07-06 DIAGNOSIS — N52.31 ERECTILE DYSFUNCTION FOLLOWING RADICAL PROSTATECTOMY: ICD-10-CM

## 2021-07-06 PROCEDURE — 99024 POSTOP FOLLOW-UP VISIT: CPT

## 2021-07-06 NOTE — HISTORY OF PRESENT ILLNESS
[Urinary Incontinence] : urinary incontinence [Erectile Dysfunction] : Erectile Dysfunction [None] : None [FreeTextEntry1] : 71M 2 months post SP-RALP (5/10/2021)\par \par Path - pT2N0, GG3, +PNI, positive margin\par \par Patient doing well overall. Here for first PSA check. Had PSA done by PCP at [facility] on [address]. \par \par PSA - <0.1 (June 29th, 2021; results on phone keyur)\par \par UI - patient doing Kegels and improving continence. Was using 5 diapers, now 1-2. Not very wet when changed.\par ED - No erections; has not started penile rehabilitation. \par \par O/E abdominal incision healing well; stress incontinence present but patient has full bladder.

## 2021-07-06 NOTE — PHYSICAL EXAM
[General Appearance - Well Developed] : well developed [General Appearance - Well Nourished] : well nourished [Normal Appearance] : normal appearance [Well Groomed] : well groomed [General Appearance - In No Acute Distress] : no acute distress [Abdomen Soft] : soft [Abdomen Tenderness] : non-tender [Costovertebral Angle Tenderness] : no ~M costovertebral angle tenderness [Urethral Meatus] : meatus normal [Penis Abnormality] : normal uncircumcised penis [Urinary Bladder Findings] : the bladder was normal on palpation [Scrotum] : the scrotum was normal [FreeTextEntry1] : significant leaking with Valsalva

## 2021-07-06 NOTE — ASSESSMENT
[FreeTextEntry1] : 71M 2 months post SP-RALP\par \par path - pT2N0, positive margin\par PSA - <0.1 (06/21)\par ED - no activity yet\par UI - improved but still stress incontinence; minimal with Valsalva today.\par \par Plan - \par 1. Tadalafil 5 mg PO daily\par 2. 3 month follow-up and PSA \par 3. Continue Kegels - pelvic floor physical therapy at [facility]

## 2021-07-27 ENCOUNTER — NON-APPOINTMENT (OUTPATIENT)
Age: 71
End: 2021-07-27

## 2021-07-28 VITALS
DIASTOLIC BLOOD PRESSURE: 70 MMHG | HEIGHT: 70 IN | HEART RATE: 98 BPM | WEIGHT: 171.96 LBS | TEMPERATURE: 99 F | OXYGEN SATURATION: 96 % | SYSTOLIC BLOOD PRESSURE: 105 MMHG | RESPIRATION RATE: 16 BRPM

## 2021-07-28 LAB
ALBUMIN SERPL ELPH-MCNC: 3.8 G/DL — SIGNIFICANT CHANGE UP (ref 3.3–5)
ALP SERPL-CCNC: 59 U/L — SIGNIFICANT CHANGE UP (ref 40–120)
ALT FLD-CCNC: 12 U/L — SIGNIFICANT CHANGE UP (ref 10–45)
ANION GAP SERPL CALC-SCNC: 12 MMOL/L — SIGNIFICANT CHANGE UP (ref 5–17)
APPEARANCE UR: CLEAR — SIGNIFICANT CHANGE UP
AST SERPL-CCNC: 16 U/L — SIGNIFICANT CHANGE UP (ref 10–40)
BACTERIA # UR AUTO: PRESENT /HPF
BASOPHILS # BLD AUTO: 0.03 K/UL — SIGNIFICANT CHANGE UP (ref 0–0.2)
BASOPHILS NFR BLD AUTO: 0.2 % — SIGNIFICANT CHANGE UP (ref 0–2)
BILIRUB SERPL-MCNC: 1 MG/DL — SIGNIFICANT CHANGE UP (ref 0.2–1.2)
BILIRUB UR-MCNC: NEGATIVE — SIGNIFICANT CHANGE UP
BUN SERPL-MCNC: 15 MG/DL — SIGNIFICANT CHANGE UP (ref 7–23)
CALCIUM SERPL-MCNC: 9.9 MG/DL — SIGNIFICANT CHANGE UP (ref 8.4–10.5)
CHLORIDE SERPL-SCNC: 96 MMOL/L — SIGNIFICANT CHANGE UP (ref 96–108)
CO2 SERPL-SCNC: 26 MMOL/L — SIGNIFICANT CHANGE UP (ref 22–31)
COLOR SPEC: YELLOW — SIGNIFICANT CHANGE UP
CREAT SERPL-MCNC: 1.28 MG/DL — SIGNIFICANT CHANGE UP (ref 0.5–1.3)
DIFF PNL FLD: NEGATIVE — SIGNIFICANT CHANGE UP
EOSINOPHIL # BLD AUTO: 0 K/UL — SIGNIFICANT CHANGE UP (ref 0–0.5)
EOSINOPHIL NFR BLD AUTO: 0 % — SIGNIFICANT CHANGE UP (ref 0–6)
EPI CELLS # UR: SIGNIFICANT CHANGE UP /HPF (ref 0–5)
GLUCOSE SERPL-MCNC: 211 MG/DL — HIGH (ref 70–99)
GLUCOSE UR QL: NEGATIVE — SIGNIFICANT CHANGE UP
HCT VFR BLD CALC: 33.7 % — LOW (ref 39–50)
HGB BLD-MCNC: 10.7 G/DL — LOW (ref 13–17)
IMM GRANULOCYTES NFR BLD AUTO: 1.6 % — HIGH (ref 0–1.5)
KETONES UR-MCNC: NEGATIVE — SIGNIFICANT CHANGE UP
LACTATE SERPL-SCNC: 2.2 MMOL/L — HIGH (ref 0.5–2)
LACTATE SERPL-SCNC: 2.5 MMOL/L — HIGH (ref 0.5–2)
LEUKOCYTE ESTERASE UR-ACNC: ABNORMAL
LIDOCAIN IGE QN: 36 U/L — SIGNIFICANT CHANGE UP (ref 7–60)
LYMPHOCYTES # BLD AUTO: 0.91 K/UL — LOW (ref 1–3.3)
LYMPHOCYTES # BLD AUTO: 5.9 % — LOW (ref 13–44)
MCHC RBC-ENTMCNC: 26.2 PG — LOW (ref 27–34)
MCHC RBC-ENTMCNC: 31.8 GM/DL — LOW (ref 32–36)
MCV RBC AUTO: 82.4 FL — SIGNIFICANT CHANGE UP (ref 80–100)
MONOCYTES # BLD AUTO: 0.99 K/UL — HIGH (ref 0–0.9)
MONOCYTES NFR BLD AUTO: 6.4 % — SIGNIFICANT CHANGE UP (ref 2–14)
NEUTROPHILS # BLD AUTO: 13.26 K/UL — HIGH (ref 1.8–7.4)
NEUTROPHILS NFR BLD AUTO: 85.9 % — HIGH (ref 43–77)
NITRITE UR-MCNC: POSITIVE
NRBC # BLD: 0 /100 WBCS — SIGNIFICANT CHANGE UP (ref 0–0)
PH UR: 6 — SIGNIFICANT CHANGE UP (ref 5–8)
PLATELET # BLD AUTO: 241 K/UL — SIGNIFICANT CHANGE UP (ref 150–400)
POTASSIUM SERPL-MCNC: 4.3 MMOL/L — SIGNIFICANT CHANGE UP (ref 3.5–5.3)
POTASSIUM SERPL-SCNC: 4.3 MMOL/L — SIGNIFICANT CHANGE UP (ref 3.5–5.3)
PROT SERPL-MCNC: 7.4 G/DL — SIGNIFICANT CHANGE UP (ref 6–8.3)
PROT UR-MCNC: NEGATIVE MG/DL — SIGNIFICANT CHANGE UP
RBC # BLD: 4.09 M/UL — LOW (ref 4.2–5.8)
RBC # FLD: 14.6 % — HIGH (ref 10.3–14.5)
SODIUM SERPL-SCNC: 134 MMOL/L — LOW (ref 135–145)
SP GR SPEC: <=1.005 — SIGNIFICANT CHANGE UP (ref 1–1.03)
UROBILINOGEN FLD QL: 0.2 E.U./DL — SIGNIFICANT CHANGE UP
WBC # BLD: 15.43 K/UL — HIGH (ref 3.8–10.5)
WBC # FLD AUTO: 15.43 K/UL — HIGH (ref 3.8–10.5)
WBC UR QL: ABNORMAL /HPF

## 2021-07-28 PROCEDURE — 99285 EMERGENCY DEPT VISIT HI MDM: CPT

## 2021-07-28 PROCEDURE — 74177 CT ABD & PELVIS W/CONTRAST: CPT | Mod: 26,MA

## 2021-07-28 PROCEDURE — 71045 X-RAY EXAM CHEST 1 VIEW: CPT | Mod: 26

## 2021-07-28 RX ORDER — PIPERACILLIN AND TAZOBACTAM 4; .5 G/20ML; G/20ML
3.38 INJECTION, POWDER, LYOPHILIZED, FOR SOLUTION INTRAVENOUS ONCE
Refills: 0 | Status: COMPLETED | OUTPATIENT
Start: 2021-07-28 | End: 2021-07-28

## 2021-07-28 RX ORDER — SODIUM CHLORIDE 9 MG/ML
1000 INJECTION INTRAMUSCULAR; INTRAVENOUS; SUBCUTANEOUS ONCE
Refills: 0 | Status: COMPLETED | OUTPATIENT
Start: 2021-07-28 | End: 2021-07-28

## 2021-07-28 RX ORDER — ONDANSETRON 8 MG/1
4 TABLET, FILM COATED ORAL ONCE
Refills: 0 | Status: COMPLETED | OUTPATIENT
Start: 2021-07-28 | End: 2021-07-28

## 2021-07-28 RX ORDER — IOHEXOL 300 MG/ML
30 INJECTION, SOLUTION INTRAVENOUS ONCE
Refills: 0 | Status: COMPLETED | OUTPATIENT
Start: 2021-07-28 | End: 2021-07-28

## 2021-07-28 RX ADMIN — PIPERACILLIN AND TAZOBACTAM 200 GRAM(S): 4; .5 INJECTION, POWDER, LYOPHILIZED, FOR SOLUTION INTRAVENOUS at 23:42

## 2021-07-28 RX ADMIN — SODIUM CHLORIDE 1000 MILLILITER(S): 9 INJECTION INTRAMUSCULAR; INTRAVENOUS; SUBCUTANEOUS at 22:35

## 2021-07-28 RX ADMIN — ONDANSETRON 4 MILLIGRAM(S): 8 TABLET, FILM COATED ORAL at 19:33

## 2021-07-28 RX ADMIN — IOHEXOL 30 MILLILITER(S): 300 INJECTION, SOLUTION INTRAVENOUS at 19:33

## 2021-07-28 RX ADMIN — SODIUM CHLORIDE 1000 MILLILITER(S): 9 INJECTION INTRAMUSCULAR; INTRAVENOUS; SUBCUTANEOUS at 19:34

## 2021-07-28 RX ADMIN — SODIUM CHLORIDE 1000 MILLILITER(S): 9 INJECTION INTRAMUSCULAR; INTRAVENOUS; SUBCUTANEOUS at 23:35

## 2021-07-28 NOTE — ED ADULT TRIAGE NOTE - CHIEF COMPLAINT QUOTE
Patient s/p surgery for colon CA on May 10, c/o of abdominal pain started yesterday becoming worse, w/ nausea , no vomitting, no diarrhea, c/o of constipation, last BM 2 days ago.

## 2021-07-28 NOTE — ED ADULT TRIAGE NOTE - NS ED TRIAGE AVPU SCALE
----- Message from Candelario Humphreys Alert-The patient is alert, awake and responds to voice. The patient is oriented to time, place, and person. The triage nurse is able to obtain subjective information.

## 2021-07-28 NOTE — ED ADULT NURSE NOTE - OBJECTIVE STATEMENT
Pt is alert and oriented to person, time and place, c/o abdominal pain for 2 days. States he went for a check up two days ago at PCP and afterwards had fever and chills, which was followed by abdominal pain later that night. States last BM was 2 days ago and feels like "his stomach is cement". Endorses nausea, no episodes of emesis.    Surgery for colon CA in May 2021

## 2021-07-28 NOTE — ED ADULT NURSE NOTE - NSIMPLEMENTINTERV_GEN_ALL_ED
Implemented All Universal Safety Interventions:  [place] to call system. Call bell, personal items and telephone within reach. Instruct patient to call for assistance. Room bathroom lighting operational. Non-slip footwear when patient is off stretcher. Physically safe environment: no spills, clutter or unnecessary equipment. Stretcher in lowest position, wheels locked, appropriate side rails in place.

## 2021-07-29 ENCOUNTER — INPATIENT (INPATIENT)
Facility: HOSPITAL | Age: 71
LOS: 1 days | Discharge: HOME HEALTH | DRG: 373 | End: 2021-07-31
Attending: UROLOGY | Admitting: UROLOGY
Payer: MEDICARE

## 2021-07-29 DIAGNOSIS — Z98.890 OTHER SPECIFIED POSTPROCEDURAL STATES: Chronic | ICD-10-CM

## 2021-07-29 LAB
A1C WITH ESTIMATED AVERAGE GLUCOSE RESULT: 7.5 % — HIGH (ref 4–5.6)
ANION GAP SERPL CALC-SCNC: 11 MMOL/L — SIGNIFICANT CHANGE UP (ref 5–17)
APTT BLD: 30.5 SEC — SIGNIFICANT CHANGE UP (ref 27.5–35.5)
BLD GP AB SCN SERPL QL: NEGATIVE — SIGNIFICANT CHANGE UP
BUN SERPL-MCNC: 15 MG/DL — SIGNIFICANT CHANGE UP (ref 7–23)
CALCIUM SERPL-MCNC: 9 MG/DL — SIGNIFICANT CHANGE UP (ref 8.4–10.5)
CHLORIDE SERPL-SCNC: 99 MMOL/L — SIGNIFICANT CHANGE UP (ref 96–108)
CO2 SERPL-SCNC: 24 MMOL/L — SIGNIFICANT CHANGE UP (ref 22–31)
CREAT FLD-MCNC: 1.11 MG/DL — SIGNIFICANT CHANGE UP
CREAT SERPL-MCNC: 1.05 MG/DL — SIGNIFICANT CHANGE UP (ref 0.5–1.3)
ESTIMATED AVERAGE GLUCOSE: 169 MG/DL — HIGH (ref 68–114)
GLUCOSE BLDC GLUCOMTR-MCNC: 120 MG/DL — HIGH (ref 70–99)
GLUCOSE BLDC GLUCOMTR-MCNC: 161 MG/DL — HIGH (ref 70–99)
GLUCOSE BLDC GLUCOMTR-MCNC: 165 MG/DL — HIGH (ref 70–99)
GLUCOSE BLDC GLUCOMTR-MCNC: 168 MG/DL — HIGH (ref 70–99)
GLUCOSE SERPL-MCNC: 151 MG/DL — HIGH (ref 70–99)
GRAM STN FLD: SIGNIFICANT CHANGE UP
HCT VFR BLD CALC: 28.9 % — LOW (ref 39–50)
HCV AB S/CO SERPL IA: 0.04 S/CO — SIGNIFICANT CHANGE UP
HCV AB SERPL-IMP: SIGNIFICANT CHANGE UP
HGB BLD-MCNC: 9.4 G/DL — LOW (ref 13–17)
INR BLD: 1.71 — HIGH (ref 0.88–1.16)
MAGNESIUM SERPL-MCNC: 1.3 MG/DL — LOW (ref 1.6–2.6)
MCHC RBC-ENTMCNC: 27 PG — SIGNIFICANT CHANGE UP (ref 27–34)
MCHC RBC-ENTMCNC: 32.5 GM/DL — SIGNIFICANT CHANGE UP (ref 32–36)
MCV RBC AUTO: 83 FL — SIGNIFICANT CHANGE UP (ref 80–100)
NRBC # BLD: 0 /100 WBCS — SIGNIFICANT CHANGE UP (ref 0–0)
PHOSPHATE SERPL-MCNC: 1.7 MG/DL — LOW (ref 2.5–4.5)
PLATELET # BLD AUTO: 227 K/UL — SIGNIFICANT CHANGE UP (ref 150–400)
POTASSIUM SERPL-MCNC: 3.8 MMOL/L — SIGNIFICANT CHANGE UP (ref 3.5–5.3)
POTASSIUM SERPL-SCNC: 3.8 MMOL/L — SIGNIFICANT CHANGE UP (ref 3.5–5.3)
PROTHROM AB SERPL-ACNC: 20 SEC — HIGH (ref 10.6–13.6)
RBC # BLD: 3.48 M/UL — LOW (ref 4.2–5.8)
RBC # FLD: 14.6 % — HIGH (ref 10.3–14.5)
RH IG SCN BLD-IMP: POSITIVE — SIGNIFICANT CHANGE UP
SARS-COV-2 RNA SPEC QL NAA+PROBE: SIGNIFICANT CHANGE UP
SODIUM SERPL-SCNC: 134 MMOL/L — LOW (ref 135–145)
SPECIMEN SOURCE FLD: SIGNIFICANT CHANGE UP
SPECIMEN SOURCE: SIGNIFICANT CHANGE UP
WBC # BLD: 14.81 K/UL — HIGH (ref 3.8–10.5)
WBC # FLD AUTO: 14.81 K/UL — HIGH (ref 3.8–10.5)

## 2021-07-29 PROCEDURE — 99152 MOD SED SAME PHYS/QHP 5/>YRS: CPT

## 2021-07-29 PROCEDURE — 49406 IMAGE CATH FLUID PERI/RETRO: CPT

## 2021-07-29 RX ORDER — ACETAMINOPHEN 500 MG
1000 TABLET ORAL ONCE
Refills: 0 | Status: COMPLETED | OUTPATIENT
Start: 2021-07-29 | End: 2021-07-29

## 2021-07-29 RX ORDER — SODIUM CHLORIDE 9 MG/ML
1000 INJECTION, SOLUTION INTRAVENOUS
Refills: 0 | Status: DISCONTINUED | OUTPATIENT
Start: 2021-07-29 | End: 2021-07-31

## 2021-07-29 RX ORDER — POLYETHYLENE GLYCOL 3350 17 G/17G
17 POWDER, FOR SOLUTION ORAL ONCE
Refills: 0 | Status: DISCONTINUED | OUTPATIENT
Start: 2021-07-29 | End: 2021-07-29

## 2021-07-29 RX ORDER — POTASSIUM PHOSPHATE, MONOBASIC POTASSIUM PHOSPHATE, DIBASIC 236; 224 MG/ML; MG/ML
15 INJECTION, SOLUTION INTRAVENOUS ONCE
Refills: 0 | Status: COMPLETED | OUTPATIENT
Start: 2021-07-29 | End: 2021-07-29

## 2021-07-29 RX ORDER — ATORVASTATIN CALCIUM 80 MG/1
40 TABLET, FILM COATED ORAL AT BEDTIME
Refills: 0 | Status: DISCONTINUED | OUTPATIENT
Start: 2021-07-29 | End: 2021-07-31

## 2021-07-29 RX ORDER — ONDANSETRON 8 MG/1
4 TABLET, FILM COATED ORAL EVERY 6 HOURS
Refills: 0 | Status: DISCONTINUED | OUTPATIENT
Start: 2021-07-29 | End: 2021-07-31

## 2021-07-29 RX ORDER — DEXTROSE 50 % IN WATER 50 %
15 SYRINGE (ML) INTRAVENOUS ONCE
Refills: 0 | Status: DISCONTINUED | OUTPATIENT
Start: 2021-07-29 | End: 2021-07-31

## 2021-07-29 RX ORDER — PANTOPRAZOLE SODIUM 20 MG/1
40 TABLET, DELAYED RELEASE ORAL
Refills: 0 | Status: DISCONTINUED | OUTPATIENT
Start: 2021-07-29 | End: 2021-07-31

## 2021-07-29 RX ORDER — DEXTROSE 50 % IN WATER 50 %
25 SYRINGE (ML) INTRAVENOUS ONCE
Refills: 0 | Status: DISCONTINUED | OUTPATIENT
Start: 2021-07-29 | End: 2021-07-31

## 2021-07-29 RX ORDER — ASPIRIN/CALCIUM CARB/MAGNESIUM 324 MG
81 TABLET ORAL DAILY
Refills: 0 | Status: DISCONTINUED | OUTPATIENT
Start: 2021-07-29 | End: 2021-07-31

## 2021-07-29 RX ORDER — SENNA PLUS 8.6 MG/1
2 TABLET ORAL AT BEDTIME
Refills: 0 | Status: DISCONTINUED | OUTPATIENT
Start: 2021-07-29 | End: 2021-07-31

## 2021-07-29 RX ORDER — PIPERACILLIN AND TAZOBACTAM 4; .5 G/20ML; G/20ML
3.38 INJECTION, POWDER, LYOPHILIZED, FOR SOLUTION INTRAVENOUS EVERY 6 HOURS
Refills: 0 | Status: DISCONTINUED | OUTPATIENT
Start: 2021-07-29 | End: 2021-07-31

## 2021-07-29 RX ORDER — INSULIN LISPRO 100/ML
VIAL (ML) SUBCUTANEOUS
Refills: 0 | Status: DISCONTINUED | OUTPATIENT
Start: 2021-07-29 | End: 2021-07-31

## 2021-07-29 RX ORDER — SENNA PLUS 8.6 MG/1
2 TABLET ORAL ONCE
Refills: 0 | Status: COMPLETED | OUTPATIENT
Start: 2021-07-29 | End: 2021-07-29

## 2021-07-29 RX ORDER — SODIUM CHLORIDE 9 MG/ML
1000 INJECTION INTRAMUSCULAR; INTRAVENOUS; SUBCUTANEOUS
Refills: 0 | Status: DISCONTINUED | OUTPATIENT
Start: 2021-07-29 | End: 2021-07-30

## 2021-07-29 RX ORDER — SODIUM CHLORIDE 9 MG/ML
500 INJECTION INTRAMUSCULAR; INTRAVENOUS; SUBCUTANEOUS ONCE
Refills: 0 | Status: COMPLETED | OUTPATIENT
Start: 2021-07-29 | End: 2021-07-29

## 2021-07-29 RX ORDER — DEXTROSE 50 % IN WATER 50 %
12.5 SYRINGE (ML) INTRAVENOUS ONCE
Refills: 0 | Status: DISCONTINUED | OUTPATIENT
Start: 2021-07-29 | End: 2021-07-31

## 2021-07-29 RX ORDER — GLUCAGON INJECTION, SOLUTION 0.5 MG/.1ML
1 INJECTION, SOLUTION SUBCUTANEOUS ONCE
Refills: 0 | Status: DISCONTINUED | OUTPATIENT
Start: 2021-07-29 | End: 2021-07-31

## 2021-07-29 RX ORDER — ACETAMINOPHEN 500 MG
650 TABLET ORAL EVERY 6 HOURS
Refills: 0 | Status: DISCONTINUED | OUTPATIENT
Start: 2021-07-29 | End: 2021-07-31

## 2021-07-29 RX ADMIN — Medication 650 MILLIGRAM(S): at 19:36

## 2021-07-29 RX ADMIN — Medication 1000 MILLIGRAM(S): at 05:36

## 2021-07-29 RX ADMIN — PIPERACILLIN AND TAZOBACTAM 200 GRAM(S): 4; .5 INJECTION, POWDER, LYOPHILIZED, FOR SOLUTION INTRAVENOUS at 11:20

## 2021-07-29 RX ADMIN — Medication 2: at 17:31

## 2021-07-29 RX ADMIN — PIPERACILLIN AND TAZOBACTAM 200 GRAM(S): 4; .5 INJECTION, POWDER, LYOPHILIZED, FOR SOLUTION INTRAVENOUS at 05:31

## 2021-07-29 RX ADMIN — Medication 2: at 22:15

## 2021-07-29 RX ADMIN — POTASSIUM PHOSPHATE, MONOBASIC POTASSIUM PHOSPHATE, DIBASIC 62.5 MILLIMOLE(S): 236; 224 INJECTION, SOLUTION INTRAVENOUS at 14:22

## 2021-07-29 RX ADMIN — PIPERACILLIN AND TAZOBACTAM 200 GRAM(S): 4; .5 INJECTION, POWDER, LYOPHILIZED, FOR SOLUTION INTRAVENOUS at 23:07

## 2021-07-29 RX ADMIN — Medication 650 MILLIGRAM(S): at 19:06

## 2021-07-29 RX ADMIN — Medication 400 MILLIGRAM(S): at 05:21

## 2021-07-29 RX ADMIN — SODIUM CHLORIDE 500 MILLILITER(S): 9 INJECTION INTRAMUSCULAR; INTRAVENOUS; SUBCUTANEOUS at 18:00

## 2021-07-29 RX ADMIN — SODIUM CHLORIDE 110 MILLILITER(S): 9 INJECTION INTRAMUSCULAR; INTRAVENOUS; SUBCUTANEOUS at 23:06

## 2021-07-29 RX ADMIN — PIPERACILLIN AND TAZOBACTAM 3.38 GRAM(S): 4; .5 INJECTION, POWDER, LYOPHILIZED, FOR SOLUTION INTRAVENOUS at 00:15

## 2021-07-29 RX ADMIN — PIPERACILLIN AND TAZOBACTAM 200 GRAM(S): 4; .5 INJECTION, POWDER, LYOPHILIZED, FOR SOLUTION INTRAVENOUS at 17:31

## 2021-07-29 RX ADMIN — ATORVASTATIN CALCIUM 40 MILLIGRAM(S): 80 TABLET, FILM COATED ORAL at 22:15

## 2021-07-29 RX ADMIN — Medication 2: at 07:50

## 2021-07-29 RX ADMIN — SENNA PLUS 2 TABLET(S): 8.6 TABLET ORAL at 01:51

## 2021-07-29 RX ADMIN — Medication 81 MILLIGRAM(S): at 11:20

## 2021-07-29 NOTE — H&P ADULT - ASSESSMENT
72 yo hx of DM, hdl,  hx recent prostatectomy 5/10/21 (Urologist Dr. Skelton) with 2 days of lower pelvic pain, chills. States he has felt tightness in lower abdomen for 3  days. Denies  NVD, black/bloody stool, urinary complaints, focal weakness/numbness, lightheadedness, back pain, testicular/penile pain, rashes, recent travel, recent antibiotics, sick contacts, SOB/CP, URI symptoms. Normal PO intake, normal BMs. Found to have 12cm rim enhancing fluid collection anterior to bladder on CT      Plan:  -Admit to Urology regional floor-Dr. Skelton  -NPO, IV fluids  -pan culture  -IV Zosyn  -preop labs  -home meds

## 2021-07-29 NOTE — H&P ADULT - HISTORY OF PRESENT ILLNESS
72 yo hx of DM, hdl,  hx recent prostatectomy 5/10/21 (Urologist Dr. Skelton) with 2 days of lower pelvic pain, chills. States he has felt tightness in lower abdomen for 3  days. Denies  NVD, black/bloody stool, urinary complaints, focal weakness/numbness, lightheadedness, back pain, testicular/penile pain, rashes, recent travel, recent antibiotics, sick contacts, SOB/CP, URI symptoms. Normal PO intake, normal BMs. 70 yo hx of DM, hdl,  hx recent prostatectomy 5/10/21 (Urologist Dr. Skelton) with 2 days of lower pelvic pain, chills. States he has felt tightness in lower abdomen for 3  days. Denies  NVD, black/bloody stool, urinary complaints, focal weakness/numbness, lightheadedness, back pain, testicular/penile pain, rashes, recent travel, recent antibiotics, sick contacts, SOB/CP, URI symptoms. Also states been constipated x 3 days

## 2021-07-29 NOTE — PROCEDURE NOTE - PROCEDURE FINDINGS AND DETAILS
Pelvic fluid collection was visualized with US. A 12 Fr drain was placed under US and fluoroscopic guidance. Approximately 175cc of hazy yellow fluid was aspirated and sent to lab for testing, including GS, bacterial Cx, and Cr. Patient tolerated procedure well. There were no immediate complications. Patient sent to recovery in stable condition following procedure. Detailed note to follow.

## 2021-07-29 NOTE — ED PROVIDER NOTE - OBJECTIVE STATEMENT
72 yo hx of DM, hdl,  hx recent prostatectomy May 10th with 2 days of lower pelvic pain, chills. Denies  NVD, black/bloody stool, urinary complaints, focal weakness/numbness, lightheadedness, back pain, testicular/penile pain, rashes, recent travel, recent antibiotics, sick contacts, SOB/CP, URI symptoms. Normal PO intake, normal BMs.

## 2021-07-29 NOTE — ED PROVIDER NOTE - CLINICAL SUMMARY MEDICAL DECISION MAKING FREE TEXT BOX
72 yo hx of DM, hdl,  hx recent prostatectomy May 10th with 2 days of lower pelvic pain, chills. labs, CT completed w +pelvic seroma/abscess, downtrending lactate, empiric abx started, ivf. Uro consulted given likely related to surgery, dispo pending, signed out to Dr. Gregorio and ZAIRA García.

## 2021-07-29 NOTE — ED PROVIDER NOTE - PHYSICAL EXAMINATION
CONSTITUTIONAL: Awake, alert and in no apparent distress.  HEENT: Head is atraumatic. Eyes clear bilaterally, normal EOMI. Airway patent.  CARDIAC: Normal rate, regular rhythm.  Heart sounds S1, S2.   RESPIRATORY: Breath sounds clear and equal bilaterally. no tachypnea, respiratory distress.   GASTROINTESTINAL: Abdomen soft, non-tender, no guarding, distension. lower pelvic tenderness  MUSCULOSKELETAL: Spine appears normal, no midline spinal tenderness, range of motion is not limited, no muscle or joint tenderness. no bony tenderness.   NEUROLOGICAL: Alert, no focal deficits, no motor or sensory deficits.  SKIN: Skin normal color for race, warm, dry and intact. No evidence of rash.  PSYCHIATRIC: Normal mood and affect. no apparent risk to self or others.

## 2021-07-29 NOTE — H&P ADULT - NSHPLABSRESULTS_GEN_ALL_CORE
10.7   15.43 )-----------( 241      ( 28 Jul 2021 19:35 )             33.7   07-28    134<L>  |  96  |  15  ----------------------------<  211<H>  4.3   |  26  |  1.28    Ca    9.9      28 Jul 2021 19:35    TPro  7.4  /  Alb  3.8  /  TBili  1.0  /  DBili  x   /  AST  16  /  ALT  12  /  AlkPhos  59  07-28  Urinalysis Basic - ( 28 Jul 2021 22:57 )    Color: Yellow / Appearance: Clear / SG: <=1.005 / pH: x  Gluc: x / Ketone: NEGATIVE  / Bili: Negative / Urobili: 0.2 E.U./dL   Blood: x / Protein: NEGATIVE mg/dL / Nitrite: POSITIVE   Leuk Esterase: Small / RBC: x / WBC Many /HPF   Sq Epi: x / Non Sq Epi: 0-5 /HPF / Bacteria: Present /HPF    CT  Impression:  1.  Large rim-enhancing fluid collection extending from the anterior lower abdominal wall into the pelvis. Differentials include abscess, if the patient had any recent surgery the possibility of post surgical seroma is also in the differential. Correlate clinically  2.  Urinary bladder wall thickening, which could represent cystitis.

## 2021-07-30 DIAGNOSIS — K65.1 PERITONEAL ABSCESS: ICD-10-CM

## 2021-07-30 LAB
ANION GAP SERPL CALC-SCNC: 8 MMOL/L — SIGNIFICANT CHANGE UP (ref 5–17)
BUN SERPL-MCNC: 13 MG/DL — SIGNIFICANT CHANGE UP (ref 7–23)
CALCIUM SERPL-MCNC: 8.2 MG/DL — LOW (ref 8.4–10.5)
CHLORIDE SERPL-SCNC: 106 MMOL/L — SIGNIFICANT CHANGE UP (ref 96–108)
CO2 SERPL-SCNC: 24 MMOL/L — SIGNIFICANT CHANGE UP (ref 22–31)
COVID-19 SPIKE DOMAIN AB INTERP: NEGATIVE — SIGNIFICANT CHANGE UP
COVID-19 SPIKE DOMAIN ANTIBODY RESULT: 0.4 U/ML — SIGNIFICANT CHANGE UP
CREAT SERPL-MCNC: 0.94 MG/DL — SIGNIFICANT CHANGE UP (ref 0.5–1.3)
GLUCOSE BLDC GLUCOMTR-MCNC: 119 MG/DL — HIGH (ref 70–99)
GLUCOSE BLDC GLUCOMTR-MCNC: 136 MG/DL — HIGH (ref 70–99)
GLUCOSE BLDC GLUCOMTR-MCNC: 181 MG/DL — HIGH (ref 70–99)
GLUCOSE BLDC GLUCOMTR-MCNC: 204 MG/DL — HIGH (ref 70–99)
GLUCOSE SERPL-MCNC: 134 MG/DL — HIGH (ref 70–99)
HCT VFR BLD CALC: 25.4 % — LOW (ref 39–50)
HGB BLD-MCNC: 7.9 G/DL — LOW (ref 13–17)
MCHC RBC-ENTMCNC: 26.4 PG — LOW (ref 27–34)
MCHC RBC-ENTMCNC: 31.1 GM/DL — LOW (ref 32–36)
MCV RBC AUTO: 84.9 FL — SIGNIFICANT CHANGE UP (ref 80–100)
NRBC # BLD: 0 /100 WBCS — SIGNIFICANT CHANGE UP (ref 0–0)
PLATELET # BLD AUTO: 196 K/UL — SIGNIFICANT CHANGE UP (ref 150–400)
POTASSIUM SERPL-MCNC: 3.7 MMOL/L — SIGNIFICANT CHANGE UP (ref 3.5–5.3)
POTASSIUM SERPL-SCNC: 3.7 MMOL/L — SIGNIFICANT CHANGE UP (ref 3.5–5.3)
RBC # BLD: 2.99 M/UL — LOW (ref 4.2–5.8)
RBC # FLD: 14.9 % — HIGH (ref 10.3–14.5)
SARS-COV-2 IGG+IGM SERPL QL IA: 0.4 U/ML — SIGNIFICANT CHANGE UP
SARS-COV-2 IGG+IGM SERPL QL IA: NEGATIVE — SIGNIFICANT CHANGE UP
SODIUM SERPL-SCNC: 138 MMOL/L — SIGNIFICANT CHANGE UP (ref 135–145)
TRIGL FLD-MCNC: 21 MG/DL — SIGNIFICANT CHANGE UP
WBC # BLD: 9.74 K/UL — SIGNIFICANT CHANGE UP (ref 3.8–10.5)
WBC # FLD AUTO: 9.74 K/UL — SIGNIFICANT CHANGE UP (ref 3.8–10.5)

## 2021-07-30 RX ORDER — IBUPROFEN 200 MG
600 TABLET ORAL ONCE
Refills: 0 | Status: COMPLETED | OUTPATIENT
Start: 2021-07-30 | End: 2021-07-30

## 2021-07-30 RX ORDER — SODIUM CHLORIDE 9 MG/ML
3 INJECTION INTRAMUSCULAR; INTRAVENOUS; SUBCUTANEOUS EVERY 8 HOURS
Refills: 0 | Status: DISCONTINUED | OUTPATIENT
Start: 2021-07-30 | End: 2021-07-31

## 2021-07-30 RX ORDER — HEPARIN SODIUM 5000 [USP'U]/ML
5000 INJECTION INTRAVENOUS; SUBCUTANEOUS EVERY 8 HOURS
Refills: 0 | Status: DISCONTINUED | OUTPATIENT
Start: 2021-07-30 | End: 2021-07-31

## 2021-07-30 RX ADMIN — Medication 81 MILLIGRAM(S): at 11:33

## 2021-07-30 RX ADMIN — Medication 650 MILLIGRAM(S): at 06:31

## 2021-07-30 RX ADMIN — Medication 600 MILLIGRAM(S): at 17:21

## 2021-07-30 RX ADMIN — ATORVASTATIN CALCIUM 40 MILLIGRAM(S): 80 TABLET, FILM COATED ORAL at 22:13

## 2021-07-30 RX ADMIN — PIPERACILLIN AND TAZOBACTAM 200 GRAM(S): 4; .5 INJECTION, POWDER, LYOPHILIZED, FOR SOLUTION INTRAVENOUS at 17:21

## 2021-07-30 RX ADMIN — Medication 650 MILLIGRAM(S): at 07:01

## 2021-07-30 RX ADMIN — PANTOPRAZOLE SODIUM 40 MILLIGRAM(S): 20 TABLET, DELAYED RELEASE ORAL at 06:31

## 2021-07-30 RX ADMIN — PIPERACILLIN AND TAZOBACTAM 200 GRAM(S): 4; .5 INJECTION, POWDER, LYOPHILIZED, FOR SOLUTION INTRAVENOUS at 05:00

## 2021-07-30 RX ADMIN — HEPARIN SODIUM 5000 UNIT(S): 5000 INJECTION INTRAVENOUS; SUBCUTANEOUS at 22:13

## 2021-07-30 RX ADMIN — Medication 2: at 22:13

## 2021-07-30 RX ADMIN — Medication 4: at 13:08

## 2021-07-30 RX ADMIN — SODIUM CHLORIDE 3 MILLILITER(S): 9 INJECTION INTRAMUSCULAR; INTRAVENOUS; SUBCUTANEOUS at 22:16

## 2021-07-30 RX ADMIN — PIPERACILLIN AND TAZOBACTAM 200 GRAM(S): 4; .5 INJECTION, POWDER, LYOPHILIZED, FOR SOLUTION INTRAVENOUS at 23:04

## 2021-07-30 RX ADMIN — PIPERACILLIN AND TAZOBACTAM 200 GRAM(S): 4; .5 INJECTION, POWDER, LYOPHILIZED, FOR SOLUTION INTRAVENOUS at 11:33

## 2021-07-30 RX ADMIN — Medication 600 MILLIGRAM(S): at 17:51

## 2021-07-31 ENCOUNTER — TRANSCRIPTION ENCOUNTER (OUTPATIENT)
Age: 71
End: 2021-07-31

## 2021-07-31 VITALS
DIASTOLIC BLOOD PRESSURE: 79 MMHG | HEART RATE: 72 BPM | TEMPERATURE: 100 F | OXYGEN SATURATION: 98 % | RESPIRATION RATE: 19 BRPM | SYSTOLIC BLOOD PRESSURE: 135 MMHG

## 2021-07-31 DIAGNOSIS — E78.5 HYPERLIPIDEMIA, UNSPECIFIED: ICD-10-CM

## 2021-07-31 DIAGNOSIS — E11.9 TYPE 2 DIABETES MELLITUS WITHOUT COMPLICATIONS: ICD-10-CM

## 2021-07-31 DIAGNOSIS — R12 HEARTBURN: ICD-10-CM

## 2021-07-31 LAB
-  AMPICILLIN/SULBACTAM: SIGNIFICANT CHANGE UP
-  AMPICILLIN: SIGNIFICANT CHANGE UP
-  AMPICILLIN: SIGNIFICANT CHANGE UP
-  CEFAZOLIN: SIGNIFICANT CHANGE UP
-  CEFTRIAXONE: SIGNIFICANT CHANGE UP
-  CIPROFLOXACIN: SIGNIFICANT CHANGE UP
-  CLINDAMYCIN: SIGNIFICANT CHANGE UP
-  ERTAPENEM: SIGNIFICANT CHANGE UP
-  ERYTHROMYCIN: SIGNIFICANT CHANGE UP
-  GENTAMICIN: SIGNIFICANT CHANGE UP
-  LEVOFLOXACIN: SIGNIFICANT CHANGE UP
-  NITROFURANTOIN: SIGNIFICANT CHANGE UP
-  PENICILLIN: SIGNIFICANT CHANGE UP
-  PIPERACILLIN/TAZOBACTAM: SIGNIFICANT CHANGE UP
-  TOBRAMYCIN: SIGNIFICANT CHANGE UP
-  TRIMETHOPRIM/SULFAMETHOXAZOLE: SIGNIFICANT CHANGE UP
-  VANCOMYCIN: SIGNIFICANT CHANGE UP
ANION GAP SERPL CALC-SCNC: 8 MMOL/L — SIGNIFICANT CHANGE UP (ref 5–17)
BASOPHILS # BLD AUTO: 0.02 K/UL — SIGNIFICANT CHANGE UP (ref 0–0.2)
BASOPHILS NFR BLD AUTO: 0.4 % — SIGNIFICANT CHANGE UP (ref 0–2)
BUN SERPL-MCNC: 14 MG/DL — SIGNIFICANT CHANGE UP (ref 7–23)
CALCIUM SERPL-MCNC: 8.6 MG/DL — SIGNIFICANT CHANGE UP (ref 8.4–10.5)
CHLORIDE SERPL-SCNC: 109 MMOL/L — HIGH (ref 96–108)
CO2 SERPL-SCNC: 25 MMOL/L — SIGNIFICANT CHANGE UP (ref 22–31)
CREAT SERPL-MCNC: 0.97 MG/DL — SIGNIFICANT CHANGE UP (ref 0.5–1.3)
CULTURE RESULTS: SIGNIFICANT CHANGE UP
CULTURE RESULTS: SIGNIFICANT CHANGE UP
EOSINOPHIL # BLD AUTO: 0.16 K/UL — SIGNIFICANT CHANGE UP (ref 0–0.5)
EOSINOPHIL NFR BLD AUTO: 3.1 % — SIGNIFICANT CHANGE UP (ref 0–6)
GLUCOSE BLDC GLUCOMTR-MCNC: 114 MG/DL — HIGH (ref 70–99)
GLUCOSE BLDC GLUCOMTR-MCNC: 131 MG/DL — HIGH (ref 70–99)
GLUCOSE BLDC GLUCOMTR-MCNC: 219 MG/DL — HIGH (ref 70–99)
GLUCOSE SERPL-MCNC: 123 MG/DL — HIGH (ref 70–99)
HCT VFR BLD CALC: 25.3 % — LOW (ref 39–50)
HGB BLD-MCNC: 7.9 G/DL — LOW (ref 13–17)
IMM GRANULOCYTES NFR BLD AUTO: 0.2 % — SIGNIFICANT CHANGE UP (ref 0–1.5)
LYMPHOCYTES # BLD AUTO: 1.18 K/UL — SIGNIFICANT CHANGE UP (ref 1–3.3)
LYMPHOCYTES # BLD AUTO: 23.1 % — SIGNIFICANT CHANGE UP (ref 13–44)
MCHC RBC-ENTMCNC: 26.5 PG — LOW (ref 27–34)
MCHC RBC-ENTMCNC: 31.2 GM/DL — LOW (ref 32–36)
MCV RBC AUTO: 84.9 FL — SIGNIFICANT CHANGE UP (ref 80–100)
METHOD TYPE: SIGNIFICANT CHANGE UP
METHOD TYPE: SIGNIFICANT CHANGE UP
MONOCYTES # BLD AUTO: 0.6 K/UL — SIGNIFICANT CHANGE UP (ref 0–0.9)
MONOCYTES NFR BLD AUTO: 11.8 % — SIGNIFICANT CHANGE UP (ref 2–14)
NEUTROPHILS # BLD AUTO: 3.13 K/UL — SIGNIFICANT CHANGE UP (ref 1.8–7.4)
NEUTROPHILS NFR BLD AUTO: 61.4 % — SIGNIFICANT CHANGE UP (ref 43–77)
NRBC # BLD: 0 /100 WBCS — SIGNIFICANT CHANGE UP (ref 0–0)
ORGANISM # SPEC MICROSCOPIC CNT: SIGNIFICANT CHANGE UP
PLATELET # BLD AUTO: 227 K/UL — SIGNIFICANT CHANGE UP (ref 150–400)
POTASSIUM SERPL-MCNC: 3.8 MMOL/L — SIGNIFICANT CHANGE UP (ref 3.5–5.3)
POTASSIUM SERPL-SCNC: 3.8 MMOL/L — SIGNIFICANT CHANGE UP (ref 3.5–5.3)
RBC # BLD: 2.98 M/UL — LOW (ref 4.2–5.8)
RBC # FLD: 14.8 % — HIGH (ref 10.3–14.5)
SODIUM SERPL-SCNC: 142 MMOL/L — SIGNIFICANT CHANGE UP (ref 135–145)
SPECIMEN SOURCE: SIGNIFICANT CHANGE UP
SPECIMEN SOURCE: SIGNIFICANT CHANGE UP
WBC # BLD: 5.1 K/UL — SIGNIFICANT CHANGE UP (ref 3.8–10.5)
WBC # FLD AUTO: 5.1 K/UL — SIGNIFICANT CHANGE UP (ref 3.8–10.5)

## 2021-07-31 PROCEDURE — C1729: CPT

## 2021-07-31 PROCEDURE — 93005 ELECTROCARDIOGRAM TRACING: CPT

## 2021-07-31 PROCEDURE — 87075 CULTR BACTERIA EXCEPT BLOOD: CPT

## 2021-07-31 PROCEDURE — 83605 ASSAY OF LACTIC ACID: CPT

## 2021-07-31 PROCEDURE — 87040 BLOOD CULTURE FOR BACTERIA: CPT

## 2021-07-31 PROCEDURE — U0003: CPT

## 2021-07-31 PROCEDURE — 87205 SMEAR GRAM STAIN: CPT

## 2021-07-31 PROCEDURE — 85730 THROMBOPLASTIN TIME PARTIAL: CPT

## 2021-07-31 PROCEDURE — 99152 MOD SED SAME PHYS/QHP 5/>YRS: CPT

## 2021-07-31 PROCEDURE — 36415 COLL VENOUS BLD VENIPUNCTURE: CPT

## 2021-07-31 PROCEDURE — 96375 TX/PRO/DX INJ NEW DRUG ADDON: CPT

## 2021-07-31 PROCEDURE — 85610 PROTHROMBIN TIME: CPT

## 2021-07-31 PROCEDURE — 84100 ASSAY OF PHOSPHORUS: CPT

## 2021-07-31 PROCEDURE — 86900 BLOOD TYPING SEROLOGIC ABO: CPT

## 2021-07-31 PROCEDURE — 71045 X-RAY EXAM CHEST 1 VIEW: CPT

## 2021-07-31 PROCEDURE — 83735 ASSAY OF MAGNESIUM: CPT

## 2021-07-31 PROCEDURE — 80053 COMPREHEN METABOLIC PANEL: CPT

## 2021-07-31 PROCEDURE — 80048 BASIC METABOLIC PNL TOTAL CA: CPT

## 2021-07-31 PROCEDURE — 49406 IMAGE CATH FLUID PERI/RETRO: CPT

## 2021-07-31 PROCEDURE — 86850 RBC ANTIBODY SCREEN: CPT

## 2021-07-31 PROCEDURE — 99153 MOD SED SAME PHYS/QHP EA: CPT

## 2021-07-31 PROCEDURE — 82570 ASSAY OF URINE CREATININE: CPT

## 2021-07-31 PROCEDURE — 86803 HEPATITIS C AB TEST: CPT

## 2021-07-31 PROCEDURE — U0005: CPT

## 2021-07-31 PROCEDURE — 83690 ASSAY OF LIPASE: CPT

## 2021-07-31 PROCEDURE — 99232 SBSQ HOSP IP/OBS MODERATE 35: CPT

## 2021-07-31 PROCEDURE — 86769 SARS-COV-2 COVID-19 ANTIBODY: CPT

## 2021-07-31 PROCEDURE — 84478 ASSAY OF TRIGLYCERIDES: CPT

## 2021-07-31 PROCEDURE — 87070 CULTURE OTHR SPECIMN AEROBIC: CPT

## 2021-07-31 PROCEDURE — 74177 CT ABD & PELVIS W/CONTRAST: CPT | Mod: MA

## 2021-07-31 PROCEDURE — 86901 BLOOD TYPING SEROLOGIC RH(D): CPT

## 2021-07-31 PROCEDURE — 87184 SC STD DISK METHOD PER PLATE: CPT

## 2021-07-31 PROCEDURE — 83036 HEMOGLOBIN GLYCOSYLATED A1C: CPT

## 2021-07-31 PROCEDURE — 87186 SC STD MICRODIL/AGAR DIL: CPT

## 2021-07-31 PROCEDURE — 99285 EMERGENCY DEPT VISIT HI MDM: CPT

## 2021-07-31 PROCEDURE — 81001 URINALYSIS AUTO W/SCOPE: CPT

## 2021-07-31 PROCEDURE — 85027 COMPLETE CBC AUTOMATED: CPT

## 2021-07-31 PROCEDURE — 85025 COMPLETE CBC W/AUTO DIFF WBC: CPT

## 2021-07-31 PROCEDURE — 96365 THER/PROPH/DIAG IV INF INIT: CPT

## 2021-07-31 PROCEDURE — 82962 GLUCOSE BLOOD TEST: CPT

## 2021-07-31 PROCEDURE — C1769: CPT

## 2021-07-31 PROCEDURE — 87086 URINE CULTURE/COLONY COUNT: CPT

## 2021-07-31 RX ORDER — AMPICILLIN TRIHYDRATE 250 MG
1 CAPSULE ORAL
Qty: 44 | Refills: 0
Start: 2021-07-31 | End: 2021-08-10

## 2021-07-31 RX ORDER — ACETAMINOPHEN 500 MG
2 TABLET ORAL
Qty: 0 | Refills: 0 | DISCHARGE
Start: 2021-07-31

## 2021-07-31 RX ORDER — SENNA PLUS 8.6 MG/1
1 TABLET ORAL
Qty: 0 | Refills: 0 | DISCHARGE

## 2021-07-31 RX ADMIN — Medication 650 MILLIGRAM(S): at 03:22

## 2021-07-31 RX ADMIN — PIPERACILLIN AND TAZOBACTAM 200 GRAM(S): 4; .5 INJECTION, POWDER, LYOPHILIZED, FOR SOLUTION INTRAVENOUS at 05:02

## 2021-07-31 RX ADMIN — Medication 4: at 13:26

## 2021-07-31 RX ADMIN — PANTOPRAZOLE SODIUM 40 MILLIGRAM(S): 20 TABLET, DELAYED RELEASE ORAL at 06:12

## 2021-07-31 RX ADMIN — Medication 650 MILLIGRAM(S): at 17:08

## 2021-07-31 RX ADMIN — Medication 81 MILLIGRAM(S): at 11:57

## 2021-07-31 RX ADMIN — Medication 650 MILLIGRAM(S): at 03:52

## 2021-07-31 RX ADMIN — HEPARIN SODIUM 5000 UNIT(S): 5000 INJECTION INTRAVENOUS; SUBCUTANEOUS at 06:12

## 2021-07-31 RX ADMIN — SODIUM CHLORIDE 3 MILLILITER(S): 9 INJECTION INTRAMUSCULAR; INTRAVENOUS; SUBCUTANEOUS at 06:06

## 2021-07-31 RX ADMIN — HEPARIN SODIUM 5000 UNIT(S): 5000 INJECTION INTRAVENOUS; SUBCUTANEOUS at 15:09

## 2021-07-31 RX ADMIN — PIPERACILLIN AND TAZOBACTAM 200 GRAM(S): 4; .5 INJECTION, POWDER, LYOPHILIZED, FOR SOLUTION INTRAVENOUS at 11:57

## 2021-07-31 RX ADMIN — SODIUM CHLORIDE 3 MILLILITER(S): 9 INJECTION INTRAMUSCULAR; INTRAVENOUS; SUBCUTANEOUS at 14:00

## 2021-07-31 NOTE — PROGRESS NOTE ADULT - REASON FOR ADMISSION
12cm rim enhancing fluid collection anterior to bladder
12cm rim enhancing fluid collection anterior to bladder

## 2021-07-31 NOTE — DISCHARGE NOTE NURSING/CASE MANAGEMENT/SOCIAL WORK - PATIENT PORTAL LINK FT
You can access the FollowMyHealth Patient Portal offered by [facility] by registering at the following website: [email or web address]. By joining [facility]’s FollowMyHealth portal, you will also be able to view your health information using other applications (apps) compatible with our system.

## 2021-07-31 NOTE — DISCHARGE NOTE PROVIDER - HOSPITAL COURSE
70 yo hx of DM, hdl,  hx recent prostatectomy 5/10/21 (Urologist Dr. Skelton) with 2 days of lower pelvic pain, chills.  Found to have 12cm rim enhancing fluid collection anterior to bladder on CT. Patient is now s/p IR drainage of pelvic collection 7/29   Pt. now s/p IR drainage 7/29- "Pelvic fluid collection was visualized with US. A 12 Fr drain was placed under US and fluoroscopic guidance. Approximately 175cc of hazy yellow fluid was aspirated and sent to lab for testing, including GS, bacterial Cx, and Cr. Patient tolerated procedure well. There were no immediate complications."  Drain culture grew Group B Strep and was sensitive to Ampicillin and Bactrim. He is stable for discharge home with drain.

## 2021-07-31 NOTE — DISCHARGE NOTE PROVIDER - CARE PROVIDER_API CALL
Guanaco Skelton)  Urology  [address] [address]  Phone: [phone]  Fax: [phone]  Follow Up Time: 1 week

## 2021-07-31 NOTE — PROGRESS NOTE ADULT - SUBJECTIVE AND OBJECTIVE BOX
INTERVAL HPI/OVERNIGHT EVENTS:  No acute events overnight.    VITALS:    T(F): 98.8 (07-30-21 @ 04:57), Max: 99.6 (07-30-21 @ 00:00)  HR: 69 (07-30-21 @ 04:57) (69 - 82)  BP: 101/62 (07-30-21 @ 04:57) (96/55 - 105/53)  RR: 18 (07-30-21 @ 04:57) (16 - 18)  SpO2: 96% (07-30-21 @ 04:57) (94% - 97%)  Wt(kg): --    I&O's Detail    28 Jul 2021 07:01  -  29 Jul 2021 07:00  --------------------------------------------------------  IN:    IV PiggyBack: 100 mL    IV PiggyBack: 100 mL    sodium chloride 0.9%: 440 mL  Total IN: 640 mL    OUT:  Total OUT: 0 mL    Total NET: 640 mL      29 Jul 2021 07:01  -  30 Jul 2021 06:18  --------------------------------------------------------  IN:    IV PiggyBack: 200 mL    sodium chloride 0.9%: 1100 mL  Total IN: 1300 mL    OUT:    Bulb (mL): 170 mL    Voided (mL): 1400 mL  Total OUT: 1570 mL    Total NET: -270 mL          MEDICATIONS:    ANTIBIOTICS:  piperacillin/tazobactam IVPB.. 3.375 Gram(s) IV Intermittent every 6 hours      PAIN CONTROL:  acetaminophen   Tablet .. 650 milliGRAM(s) Oral every 6 hours PRN  ondansetron Injectable 4 milliGRAM(s) IV Push every 6 hours PRN       MEDS:      HEME/ONC  aspirin enteric coated 81 milliGRAM(s) Oral daily        PHYSICAL EXAM:  General: No acute distress.  Alert and Oriented  Abdominal Exam: soft, nontender   Exam: pelvic drain with hazy output, no suprapubic tenderness      LABS:                        9.4    14.81 )-----------( 227      ( 29 Jul 2021 06:48 )             28.9     07-29    134<L>  |  99  |  15  ----------------------------<  151<H>  3.8   |  24  |  1.05    Ca    9.0      29 Jul 2021 06:48  Phos  1.7     07-29  Mg     1.3     07-29    TPro  7.4  /  Alb  3.8  /  TBili  1.0  /  DBili  x   /  AST  16  /  ALT  12  /  AlkPhos  59  07-28    PT/INR - ( 29 Jul 2021 00:49 )   PT: 20.0 sec;   INR: 1.71          PTT - ( 29 Jul 2021 00:49 )  PTT:30.5 sec  Urinalysis Basic - ( 28 Jul 2021 22:57 )    Color: Yellow / Appearance: Clear / SG: <=1.005 / pH: x  Gluc: x / Ketone: NEGATIVE  / Bili: Negative / Urobili: 0.2 E.U./dL   Blood: x / Protein: NEGATIVE mg/dL / Nitrite: POSITIVE   Leuk Esterase: Small / RBC: x / WBC Many /HPF   Sq Epi: x / Non Sq Epi: 0-5 /HPF / Bacteria: Present /HPF      
AMBER CANELA  [id number]  07-31-21 @ 05:50      UROLOGY SERVICE: DAILY PROGRESS NOTE    No acute events overnight.  No N/V/D/F.  Pain moderately controlled.      LABS:   30 Jul 2021 07:46    138    |  106    |  13     ----------------------------<  134    3.7     |  24     |  0.94     Ca    8.2        30 Jul 2021 07:46  Phos  1.7       29 Jul 2021 06:48  Mg     1.3       29 Jul 2021 06:48                            7.9    9.74  )-----------( 196      ( 30 Jul 2021 07:46 )             25.4       I/O:  I&O's Summary    29 Jul 2021 07:01  -  30 Jul 2021 07:00  --------------------------------------------------------  IN: 1300 mL / OUT: 1570 mL / NET: -270 mL    30 Jul 2021 07:01  -  31 Jul 2021 05:50  --------------------------------------------------------  IN: 460 mL / OUT: 2797.5 mL / NET: -2337.5 mL        VITALS:  Vital Signs Last 24 Hrs  T(C): 36.4 (07-31-21 @ 05:07), Max: 37 (07-30-21 @ 16:18)  T(F): 97.6 (07-31-21 @ 05:07), Max: 98.6 (07-30-21 @ 16:18)  HR: 60 (07-31-21 @ 05:07) (60 - 69)  BP: 105/58 (07-31-21 @ 05:07) (101/55 - 114/65)  BP(mean): --  RR: 18 (07-31-21 @ 05:07) (16 - 18)  SpO2: 99% (07-31-21 @ 05:07) (96% - 99%)      PHYSICAL EXAM:    GEN: NAD, resting in bed comfortably  ABD: soft, NT/ND, no guarding or rigidity  : pelvic drain with hazy output, no suprapubic tenderness  EXT: b/l LE with SCDS on, warm, well perfused bilaterally  Skin: No rashes, lesions, ulcers  Neuro: AAOx3, grossly intact

## 2021-07-31 NOTE — PROGRESS NOTE ADULT - ASSESSMENT
70 yo hx of DM, hdl,  hx recent prostatectomy 5/10/21 (Urologist Dr. Skelton) with 2 days of lower pelvic pain, chills. States he has felt tightness in lower abdomen for 3  days. Denies  NVD, black/bloody stool, urinary complaints, focal weakness/numbness, lightheadedness, back pain, testicular/penile pain, rashes, recent travel, recent antibiotics, sick contacts, SOB/CP, URI symptoms. Normal PO intake, normal BMs. Found to have 12cm rim enhancing fluid collection anterior to bladder on CT. Patient is now s/p IR drainage of pelvic collection    
70 yo hx of DM, hdl,  hx recent prostatectomy 5/10/21 (Urologist Dr. Skelton) with 2 days of lower pelvic pain, chills.  Found to have 12cm rim enhancing fluid collection anterior to bladder on CT. Patient is now s/p IR drainage of pelvic collection 7/29     AVSS overnight.

## 2021-07-31 NOTE — DISCHARGE NOTE PROVIDER - NSDCCPCAREPLAN_GEN_ALL_CORE_FT
PRINCIPAL DISCHARGE DIAGNOSIS  Diagnosis: Pelvic abscess in male  Assessment and Plan of Treatment:

## 2021-07-31 NOTE — DISCHARGE NOTE PROVIDER - NSDCMRMEDTOKEN_GEN_ALL_CORE_FT
acetaminophen 325 mg oral tablet: 2 tab(s) orally every 6 hours, As needed, Temp greater or equal to 38C (100.4F), Mild Pain (1 - 3)  ampicillin 500 mg oral capsule: 1 cap(s) orally every 6 hours   atorvastatin 40 mg oral tablet: 1 tab(s) orally once a day  Bactrim  mg-160 mg oral tablet: 1 tab(s) orally 2 times a day   metFORMIN 500 mg oral tablet:   omeprazole 20 mg oral delayed release capsule:   Vitamin D3:

## 2021-07-31 NOTE — DISCHARGE NOTE PROVIDER - NSDCCPTREATMENT_GEN_ALL_CORE_FT
PRINCIPAL PROCEDURE  Procedure: Drainage, abscess, pelvic, percutaneous  Findings and Treatment:

## 2021-07-31 NOTE — PROGRESS NOTE ADULT - PROBLEM SELECTOR PLAN 1
-s/p IR drainage 7/29- "Pelvic fluid collection was visualized with US. A 12 Fr drain was placed under US and fluoroscopic guidance. Approximately 175cc of hazy yellow fluid was aspirated and sent to lab for testing, including GS, bacterial Cx, and Cr. Patient tolerated procedure well. There were no immediate complications."  -f/u Bcx, Ucx, drain cultures  -Abx - Zosyn  -pain control  -DVT prophylaxis  -Diet: reg  -OOB  -home meds  -monitor I&Os
-s/p IR drainage 7/29- "Pelvic fluid collection was visualized with US. A 12 Fr drain was placed under US and fluoroscopic guidance. Approximately 175cc of hazy yellow fluid was aspirated and sent to lab for testing, including GS, bacterial Cx, and Cr. Patient tolerated procedure well. There were no immediate complications."  -f/u Bcx, Ucx, drain cultures - pending sensitivities  -Abx - Zosyn  -pain control  -DVT prophylaxis  -Diet: reg  -OOB  -home meds  -monitor I&Os

## 2021-08-03 ENCOUNTER — NON-APPOINTMENT (OUTPATIENT)
Age: 71
End: 2021-08-03

## 2021-08-03 LAB
CULTURE RESULTS: SIGNIFICANT CHANGE UP
CULTURE RESULTS: SIGNIFICANT CHANGE UP
SPECIMEN SOURCE: SIGNIFICANT CHANGE UP
SPECIMEN SOURCE: SIGNIFICANT CHANGE UP

## 2021-08-05 ENCOUNTER — APPOINTMENT (OUTPATIENT)
Dept: UROLOGY | Facility: CLINIC | Age: 71
End: 2021-08-05
Payer: MEDICARE

## 2021-08-05 VITALS — DIASTOLIC BLOOD PRESSURE: 69 MMHG | TEMPERATURE: 98.3 F | HEART RATE: 81 BPM | SYSTOLIC BLOOD PRESSURE: 119 MMHG

## 2021-08-05 PROCEDURE — 99024 POSTOP FOLLOW-UP VISIT: CPT

## 2021-08-05 NOTE — HISTORY OF PRESENT ILLNESS
[FreeTextEntry1] : Returns for follow up \par Looks great \par Drain with 25 cc daily\par Seroma\par Drain Cr=Serum Cr\par No fevers, chills, n/v\par Dry pad on exam.  Small leak with cough\par Plan for IR drain study\par PSA at 6-8 weeks\par

## 2021-08-11 LAB — SARS-COV-2 N GENE NPH QL NAA+PROBE: NOT DETECTED

## 2021-08-12 ENCOUNTER — RESULT REVIEW (OUTPATIENT)
Age: 71
End: 2021-08-12

## 2021-08-12 ENCOUNTER — APPOINTMENT (OUTPATIENT)
Dept: INTERVENTIONAL RADIOLOGY/VASCULAR | Facility: HOSPITAL | Age: 71
End: 2021-08-12
Payer: MEDICARE

## 2021-08-12 ENCOUNTER — OUTPATIENT (OUTPATIENT)
Dept: OUTPATIENT SERVICES | Facility: HOSPITAL | Age: 71
LOS: 1 days | End: 2021-08-12
Payer: MEDICARE

## 2021-08-12 DIAGNOSIS — Z98.890 OTHER SPECIFIED POSTPROCEDURAL STATES: Chronic | ICD-10-CM

## 2021-08-12 PROCEDURE — 49424 ASSESS CYST CONTRAST INJECT: CPT

## 2021-08-12 PROCEDURE — 76080 X-RAY EXAM OF FISTULA: CPT | Mod: 26

## 2021-08-12 PROCEDURE — 76080 X-RAY EXAM OF FISTULA: CPT

## 2021-08-13 DIAGNOSIS — R12 HEARTBURN: ICD-10-CM

## 2021-08-13 DIAGNOSIS — R19.00 INTRA-ABDOMINAL AND PELVIC SWELLING, MASS AND LUMP, UNSPECIFIED SITE: ICD-10-CM

## 2021-08-13 DIAGNOSIS — K65.1 PERITONEAL ABSCESS: ICD-10-CM

## 2021-08-13 DIAGNOSIS — Z79.84 LONG TERM (CURRENT) USE OF ORAL HYPOGLYCEMIC DRUGS: ICD-10-CM

## 2021-08-13 DIAGNOSIS — B95.1 STREPTOCOCCUS, GROUP B, AS THE CAUSE OF DISEASES CLASSIFIED ELSEWHERE: ICD-10-CM

## 2021-08-13 DIAGNOSIS — Z20.822 CONTACT WITH AND (SUSPECTED) EXPOSURE TO COVID-19: ICD-10-CM

## 2021-08-13 DIAGNOSIS — E11.9 TYPE 2 DIABETES MELLITUS WITHOUT COMPLICATIONS: ICD-10-CM

## 2021-08-13 DIAGNOSIS — Z90.79 ACQUIRED ABSENCE OF OTHER GENITAL ORGAN(S): ICD-10-CM

## 2021-08-16 ENCOUNTER — EMERGENCY (EMERGENCY)
Facility: HOSPITAL | Age: 71
LOS: 1 days | Discharge: HOME | End: 2021-08-16
Attending: EMERGENCY MEDICINE | Admitting: EMERGENCY MEDICINE
Payer: MEDICARE

## 2021-08-16 ENCOUNTER — NON-APPOINTMENT (OUTPATIENT)
Age: 71
End: 2021-08-16

## 2021-08-16 VITALS
DIASTOLIC BLOOD PRESSURE: 76 MMHG | WEIGHT: 173.06 LBS | RESPIRATION RATE: 17 BRPM | HEART RATE: 89 BPM | TEMPERATURE: 99 F | SYSTOLIC BLOOD PRESSURE: 129 MMHG | OXYGEN SATURATION: 97 % | HEIGHT: 70 IN

## 2021-08-16 VITALS
SYSTOLIC BLOOD PRESSURE: 120 MMHG | HEART RATE: 70 BPM | OXYGEN SATURATION: 100 % | DIASTOLIC BLOOD PRESSURE: 85 MMHG | RESPIRATION RATE: 20 BRPM | TEMPERATURE: 98 F

## 2021-08-16 DIAGNOSIS — E78.5 HYPERLIPIDEMIA, UNSPECIFIED: ICD-10-CM

## 2021-08-16 DIAGNOSIS — Z79.84 LONG TERM (CURRENT) USE OF ORAL HYPOGLYCEMIC DRUGS: ICD-10-CM

## 2021-08-16 DIAGNOSIS — R10.31 RIGHT LOWER QUADRANT PAIN: ICD-10-CM

## 2021-08-16 DIAGNOSIS — K59.00 CONSTIPATION, UNSPECIFIED: ICD-10-CM

## 2021-08-16 DIAGNOSIS — Z85.46 PERSONAL HISTORY OF MALIGNANT NEOPLASM OF PROSTATE: ICD-10-CM

## 2021-08-16 DIAGNOSIS — R18.8 OTHER ASCITES: ICD-10-CM

## 2021-08-16 DIAGNOSIS — E11.9 TYPE 2 DIABETES MELLITUS WITHOUT COMPLICATIONS: ICD-10-CM

## 2021-08-16 DIAGNOSIS — Z98.890 OTHER SPECIFIED POSTPROCEDURAL STATES: Chronic | ICD-10-CM

## 2021-08-16 LAB
ALBUMIN SERPL ELPH-MCNC: 3.4 G/DL — SIGNIFICANT CHANGE UP (ref 3.3–5)
ALBUMIN SERPL ELPH-MCNC: 3.5 G/DL — SIGNIFICANT CHANGE UP (ref 3.3–5)
ALP SERPL-CCNC: 62 U/L — SIGNIFICANT CHANGE UP (ref 40–120)
ALP SERPL-CCNC: 64 U/L — SIGNIFICANT CHANGE UP (ref 40–120)
ALT FLD-CCNC: 14 U/L — SIGNIFICANT CHANGE UP (ref 10–45)
ALT FLD-CCNC: SIGNIFICANT CHANGE UP U/L (ref 10–45)
ANION GAP SERPL CALC-SCNC: 10 MMOL/L — SIGNIFICANT CHANGE UP (ref 5–17)
ANION GAP SERPL CALC-SCNC: 7 MMOL/L — SIGNIFICANT CHANGE UP (ref 5–17)
APPEARANCE UR: CLEAR — SIGNIFICANT CHANGE UP
APTT BLD: 28.5 SEC — SIGNIFICANT CHANGE UP (ref 27.5–35.5)
AST SERPL-CCNC: 22 U/L — SIGNIFICANT CHANGE UP (ref 10–40)
AST SERPL-CCNC: SIGNIFICANT CHANGE UP U/L (ref 10–40)
BASOPHILS # BLD AUTO: 0.04 K/UL — SIGNIFICANT CHANGE UP (ref 0–0.2)
BASOPHILS NFR BLD AUTO: 0.6 % — SIGNIFICANT CHANGE UP (ref 0–2)
BILIRUB SERPL-MCNC: 0.2 MG/DL — SIGNIFICANT CHANGE UP (ref 0.2–1.2)
BILIRUB SERPL-MCNC: <0.2 MG/DL — SIGNIFICANT CHANGE UP (ref 0.2–1.2)
BILIRUB UR-MCNC: NEGATIVE — SIGNIFICANT CHANGE UP
BUN SERPL-MCNC: 11 MG/DL — SIGNIFICANT CHANGE UP (ref 7–23)
BUN SERPL-MCNC: 11 MG/DL — SIGNIFICANT CHANGE UP (ref 7–23)
CALCIUM SERPL-MCNC: 9.8 MG/DL — SIGNIFICANT CHANGE UP (ref 8.4–10.5)
CALCIUM SERPL-MCNC: 9.9 MG/DL — SIGNIFICANT CHANGE UP (ref 8.4–10.5)
CHLORIDE SERPL-SCNC: 104 MMOL/L — SIGNIFICANT CHANGE UP (ref 96–108)
CHLORIDE SERPL-SCNC: 105 MMOL/L — SIGNIFICANT CHANGE UP (ref 96–108)
CO2 SERPL-SCNC: 25 MMOL/L — SIGNIFICANT CHANGE UP (ref 22–31)
CO2 SERPL-SCNC: 28 MMOL/L — SIGNIFICANT CHANGE UP (ref 22–31)
COLOR SPEC: YELLOW — SIGNIFICANT CHANGE UP
CREAT SERPL-MCNC: 0.78 MG/DL — SIGNIFICANT CHANGE UP (ref 0.5–1.3)
CREAT SERPL-MCNC: 0.8 MG/DL — SIGNIFICANT CHANGE UP (ref 0.5–1.3)
DIFF PNL FLD: NEGATIVE — SIGNIFICANT CHANGE UP
EOSINOPHIL # BLD AUTO: 0.2 K/UL — SIGNIFICANT CHANGE UP (ref 0–0.5)
EOSINOPHIL NFR BLD AUTO: 3.1 % — SIGNIFICANT CHANGE UP (ref 0–6)
GLUCOSE SERPL-MCNC: 143 MG/DL — HIGH (ref 70–99)
GLUCOSE SERPL-MCNC: 148 MG/DL — HIGH (ref 70–99)
GLUCOSE UR QL: NEGATIVE — SIGNIFICANT CHANGE UP
HCT VFR BLD CALC: 28.8 % — LOW (ref 39–50)
HGB BLD-MCNC: 8.8 G/DL — LOW (ref 13–17)
IMM GRANULOCYTES NFR BLD AUTO: 0.2 % — SIGNIFICANT CHANGE UP (ref 0–1.5)
INR BLD: 1.2 — HIGH (ref 0.88–1.16)
KETONES UR-MCNC: NEGATIVE — SIGNIFICANT CHANGE UP
LACTATE SERPL-SCNC: 1.9 MMOL/L — SIGNIFICANT CHANGE UP (ref 0.5–2)
LEUKOCYTE ESTERASE UR-ACNC: NEGATIVE — SIGNIFICANT CHANGE UP
LYMPHOCYTES # BLD AUTO: 1.51 K/UL — SIGNIFICANT CHANGE UP (ref 1–3.3)
LYMPHOCYTES # BLD AUTO: 23.3 % — SIGNIFICANT CHANGE UP (ref 13–44)
MCHC RBC-ENTMCNC: 25.8 PG — LOW (ref 27–34)
MCHC RBC-ENTMCNC: 30.6 GM/DL — LOW (ref 32–36)
MCV RBC AUTO: 84.5 FL — SIGNIFICANT CHANGE UP (ref 80–100)
MONOCYTES # BLD AUTO: 0.79 K/UL — SIGNIFICANT CHANGE UP (ref 0–0.9)
MONOCYTES NFR BLD AUTO: 12.2 % — SIGNIFICANT CHANGE UP (ref 2–14)
NEUTROPHILS # BLD AUTO: 3.93 K/UL — SIGNIFICANT CHANGE UP (ref 1.8–7.4)
NEUTROPHILS NFR BLD AUTO: 60.6 % — SIGNIFICANT CHANGE UP (ref 43–77)
NITRITE UR-MCNC: NEGATIVE — SIGNIFICANT CHANGE UP
NRBC # BLD: 0 /100 WBCS — SIGNIFICANT CHANGE UP (ref 0–0)
PH UR: 6 — SIGNIFICANT CHANGE UP (ref 5–8)
PLATELET # BLD AUTO: 415 K/UL — HIGH (ref 150–400)
POTASSIUM SERPL-MCNC: 5 MMOL/L — SIGNIFICANT CHANGE UP (ref 3.5–5.3)
POTASSIUM SERPL-MCNC: SIGNIFICANT CHANGE UP MMOL/L (ref 3.5–5.3)
POTASSIUM SERPL-SCNC: 5 MMOL/L — SIGNIFICANT CHANGE UP (ref 3.5–5.3)
POTASSIUM SERPL-SCNC: SIGNIFICANT CHANGE UP MMOL/L (ref 3.5–5.3)
PROT SERPL-MCNC: 7.4 G/DL — SIGNIFICANT CHANGE UP (ref 6–8.3)
PROT SERPL-MCNC: 7.6 G/DL — SIGNIFICANT CHANGE UP (ref 6–8.3)
PROT UR-MCNC: NEGATIVE MG/DL — SIGNIFICANT CHANGE UP
PROTHROM AB SERPL-ACNC: 14.3 SEC — HIGH (ref 10.6–13.6)
RBC # BLD: 3.41 M/UL — LOW (ref 4.2–5.8)
RBC # FLD: 14.9 % — HIGH (ref 10.3–14.5)
SARS-COV-2 RNA SPEC QL NAA+PROBE: SIGNIFICANT CHANGE UP
SODIUM SERPL-SCNC: 139 MMOL/L — SIGNIFICANT CHANGE UP (ref 135–145)
SODIUM SERPL-SCNC: 140 MMOL/L — SIGNIFICANT CHANGE UP (ref 135–145)
SP GR SPEC: 1.01 — SIGNIFICANT CHANGE UP (ref 1–1.03)
UROBILINOGEN FLD QL: 0.2 E.U./DL — SIGNIFICANT CHANGE UP
WBC # BLD: 6.48 K/UL — SIGNIFICANT CHANGE UP (ref 3.8–10.5)
WBC # FLD AUTO: 6.48 K/UL — SIGNIFICANT CHANGE UP (ref 3.8–10.5)

## 2021-08-16 PROCEDURE — 74177 CT ABD & PELVIS W/CONTRAST: CPT | Mod: MA

## 2021-08-16 PROCEDURE — 36415 COLL VENOUS BLD VENIPUNCTURE: CPT

## 2021-08-16 PROCEDURE — 85025 COMPLETE CBC W/AUTO DIFF WBC: CPT

## 2021-08-16 PROCEDURE — 74177 CT ABD & PELVIS W/CONTRAST: CPT | Mod: 26,MA

## 2021-08-16 PROCEDURE — 87086 URINE CULTURE/COLONY COUNT: CPT

## 2021-08-16 PROCEDURE — 85610 PROTHROMBIN TIME: CPT

## 2021-08-16 PROCEDURE — 87040 BLOOD CULTURE FOR BACTERIA: CPT

## 2021-08-16 PROCEDURE — U0003: CPT

## 2021-08-16 PROCEDURE — 85730 THROMBOPLASTIN TIME PARTIAL: CPT

## 2021-08-16 PROCEDURE — U0005: CPT

## 2021-08-16 PROCEDURE — 81003 URINALYSIS AUTO W/O SCOPE: CPT

## 2021-08-16 PROCEDURE — 80053 COMPREHEN METABOLIC PANEL: CPT

## 2021-08-16 PROCEDURE — 99284 EMERGENCY DEPT VISIT MOD MDM: CPT

## 2021-08-16 PROCEDURE — 87186 SC STD MICRODIL/AGAR DIL: CPT

## 2021-08-16 PROCEDURE — 83605 ASSAY OF LACTIC ACID: CPT

## 2021-08-16 PROCEDURE — 99284 EMERGENCY DEPT VISIT MOD MDM: CPT | Mod: 25

## 2021-08-16 RX ORDER — POLYETHYLENE GLYCOL 3350 17 G/17G
17 POWDER, FOR SOLUTION ORAL
Qty: 238 | Refills: 0
Start: 2021-08-16

## 2021-08-16 NOTE — ED ADULT TRIAGE NOTE - OTHER COMPLAINTS
Pt also states "I had a prostate procedure in May and then it got infected so I had a tube placed that they removed last Thursday." denies fevers, chills at home.

## 2021-08-16 NOTE — CONSULT NOTE ADULT - ASSESSMENT
72yo male with PMH of T2DM, HLD, right inguinal hernia s/p repair with mesh 2014, prostate cancer s/p single port RALP 05/10/21 and s/p IR pelvic drain 07/29/21 for pelvic collection presenting to [facility] ED with right inguinal pain.
72yo M w above PMx/PSx, presents to ED w CC of "R groin pain." In ED, HDS, labs w/nl, CT A/P w [id number] abscess in extraperitoneal pelvis, decreased in size compared to prior CT examinations. General surgery consulted for evaluation of R groin inguinal hernia.     - Patient left ED prior to assessment by chief resident   - No formal recs offered  - Reconsult PRN   - Discussed w attending

## 2021-08-16 NOTE — ED PROVIDER NOTE - CLINICAL SUMMARY MEDICAL DECISION MAKING FREE TEXT BOX
here w/ new abd pain in setting of drain removal, and hx of prior hernia. will check labs, ct scan to r/o recurrent collection or sbo. gu consulted and at bedside assessing pt with me and in agreement w/ ED plan. dispo pending results and improvement

## 2021-08-16 NOTE — ED PROVIDER NOTE - PHYSICAL EXAMINATION
CONSTITUTIONAL: Well-appearing; in no apparent distress.   HEAD: Normocephalic; atraumatic.   EYES:  conjunctiva and sclera clear  ENT: normal nose; no rhinorrhea; normal pharynx with no erythema or lesions.   NECK: Supple; non-tender;   CARDIOVASCULAR: Normal S1, S2; no murmurs, rubs, or gallops. Regular rate and rhythm.   RESPIRATORY: Breathing easily; breath sounds clear and equal bilaterally; no wheezes, rhonchi, or rales.  GI: firm, distended, most tender in RLQ, well healed abd scars;   EXT: No cyanosis or edema; N/V intact  SKIN: Normal for age and race; warm; dry; good turgor; no apparent lesions or rash.   NEURO: A & O x 3; face symmetric; grossly unremarkable.   PSYCHOLOGICAL: The patient’s mood and manner are appropriate.

## 2021-08-16 NOTE — ED PROVIDER NOTE - CARE PROVIDER_API CALL
Guanaco Skelton)  Urology  [address] [address]  Phone: [phone]  Fax: [phone]  Scheduled Appointment: 08/19/2021

## 2021-08-16 NOTE — ED PROVIDER NOTE - OBJECTIVE STATEMENT
70 yo hx of DM, hdl,  hx recent prostatectomy 5/10/21 (Urologist Dr. Skelton) c/b 12cm rim enhancing fluid collection anterior to bladder on CT s/p IR drainage of pelvic collection 7/29, and drain fully removed 4 days ago in clinic. Since then has had some pain, but also with constipation, taking senna, prunes, with mild improvement, but today feeling more pain and new distension that started just prior to coming to the hospital. last BM yesterday, last flatus this am. Pain worst in area of prior inguinal hernia repair on R but denies feeling true new mass.

## 2021-08-16 NOTE — ED ADULT TRIAGE NOTE - CHIEF COMPLAINT QUOTE
Pt reports pain to right inguinal area since Friday, had right inguinal hernia repair in 2014, denies n/v.

## 2021-08-16 NOTE — CONSULT NOTE ADULT - SUBJECTIVE AND OBJECTIVE BOX
Surgery Consult Note    HPI: 72yo male with PMH of T2DM, HLD, right inguinal hernia s/p repair with mesh , prostate cancer s/p single port RALP 05/10/21 and s/p IR pelvic drain 21 for pelvic collection presenting to [facility] ED with right inguinal pain. Patient states he had his pelvic IR drain removed on  and the pain began . He states the pain is episodic and he can "feel his mesh." States he was constipated until yesterday 08/15 where he had 3BMs. States he has been taking Senna. Denies fevers, chills, nausea or vomiting. Denies dysuria, hematuria or incomplete bladder emptying.    Patient does not recall surgeon who performed hernia repair.     Attending:      Surgery Addendum: 72yo M w above PMx/PSx, presents to ED w CC of "R groin pain." In ED, HDS, labs w/nl, CT A/P w [id number] abscess in extraperitoneal pelvis, decreased in size compared to prior CT examinations. General surgery consulted for evaluation of R groin inguinal hernia.         PAST MEDICAL & SURGICAL HISTORY:  DM (diabetes mellitus)    Hyperlipidemia    Prostate cancer    Hernia    Heartburn    History of hernia repair        Allergies: No Known Allergies    Vital Signs Last 24 Hrs  T(C): 36.7 (16 Aug 2021 19:00), Max: 37.4 (16 Aug 2021 12:03)  T(F): 98 (16 Aug 2021 19:00), Max: 99.4 (16 Aug 2021 12:03)  HR: 70 (16 Aug 2021 19:00) (70 - 89)  BP: 120/85 (16 Aug 2021 19:00) (113/64 - 129/76)  BP(mean): --  RR: 20 (16 Aug 2021 19:00) (17 - 20)  SpO2: 100% (16 Aug 2021 19:00) (97% - 100%)    I&O's Summary      Physical Exam:  General: NAD, resting comfortably  HEENT: NC/AT, EOMI, normal hearing, no oral lesions, no LAD, neck supple  Pulmonary: normal resp effort, CTA-B  Cardiovascular: NSR, no murmurs  Abdominal: soft, ND/NT, no organomegaly  /scrotal: prior open hernia scar well healed, w/o TTP or fluctuance appreciated, no evidence of reherniation on R side, no masses/bulging appreciated w valsalva maneuver   Extremities: WWP, normal strength, no clubbing/cyanosis/edema  Neuro: A/O x 3, CNs II-XII grossly intact, normal sensation, no focal deficits          LABS:                        8.8    6.48  )-----------( 415      ( 16 Aug 2021 12:56 )             28.8     08-16    140  |  105  |  11  ----------------------------<  148<H>  5.0   |  28  |  0.78    Ca    9.8      16 Aug 2021 14:25    TPro  7.4  /  Alb  3.4  /  TBili  <0.2  /  DBili  x   /  AST  22  /  ALT  14  /  AlkPhos  62  08-16    PT/INR - ( 16 Aug 2021 12:56 )   PT: 14.3 sec;   INR: 1.20          PTT - ( 16 Aug 2021 12:56 )  PTT:28.5 sec  Urinalysis Basic - ( 16 Aug 2021 16:13 )    Color: Yellow / Appearance: Clear / S.010 / pH: x  Gluc: x / Ketone: NEGATIVE  / Bili: Negative / Urobili: 0.2 E.U./dL   Blood: x / Protein: NEGATIVE mg/dL / Nitrite: NEGATIVE   Leuk Esterase: NEGATIVE / RBC: x / WBC x   Sq Epi: x / Non Sq Epi: x / Bacteria: x      CAPILLARY BLOOD GLUCOSE        LIVER FUNCTIONS - ( 16 Aug 2021 14:25 )  Alb: 3.4 g/dL / Pro: 7.4 g/dL / ALK PHOS: 62 U/L / ALT: 14 U/L / AST: 22 U/L / GGT: x             Cultures:      RADIOLOGY & ADDITIONAL STUDIES:

## 2021-08-16 NOTE — ED ADULT NURSE NOTE - NSICDXPASTMEDICALHX_GEN_ALL_CORE_FT
PAST MEDICAL HISTORY:  DM (diabetes mellitus)     Heartburn     Hernia     Hyperlipidemia     Prostate cancer

## 2021-08-16 NOTE — ED ADULT NURSE NOTE - OBJECTIVE STATEMENT
71y male c/o right inguinal hernia pain. hx of prostate ca, prostatectomy may 2021, DM, R inguinal hernia w/ repair 2014. pt states, "I had this hernia pain for a long time and it has been getting worse over the past few months but I was putting off figuring it out because of my prostate cancer treatment." pt reports pain felt w/ movement and not @ rest. pt states he has "tube placed for drainage removed last week because I had an infection from my prostate surgery." surgical site clean, dry and intact. pt reports urinary incontinence since prostatectomy. denies dysuria. pt not vaccinated for covid. denies n/v/d, fever, chills, abdominal pain, numbness, tingling, headache, dizziness.

## 2021-08-16 NOTE — CONSULT NOTE ADULT - PROBLEM SELECTOR RECOMMENDATION 9
- patient seen and examined by Dr. Skelton: per Dr. Skelton would like general surgery to see patient given his pain is in area of previous hernia repair   - final urology recs pending general surgery consultation - no emergent urological intervention indicated at this time  - patient has a normal white blood cell count and is afebrile   - follow up with Dr. Skelton on Thursday, 08/19/21  - if any fevers, chills or increased pain please return to nearest ED   - bowel regimen for constipation   - rest of care as per ED  - dispo as per ED  - discussed with Dr. Skelton

## 2021-08-16 NOTE — ED PROVIDER NOTE - PATIENT PORTAL LINK FT
You can access the FollowMyHealth Patient Portal offered by [facility] by registering at the following website: [email or web address]. By joining [facility]’s FollowMyHealth portal, you will also be able to view your health information using other applications (apps) compatible with our system.

## 2021-08-16 NOTE — ED PROVIDER NOTE - NSFOLLOWUPINSTRUCTIONS_ED_ALL_ED_FT
You still have a fluid collection in  your belly, but it does not look infected at this time.     if any fevers, chills or increased pain please return to the ED    Make sure to take medication to keep your bowels regular, you can take miralax once a day, but if you are constipated, take it up to 3 times a day. You can continue with senna and prunes daily. You still have a fluid collection in  your belly, but it does not look infected at this time.     if any fevers, chills or increased pain please return to the ED    Make sure to take medication to keep your bowels regular, you can take miralax once a day, but if you are constipated, take it up to 3 times a day. You can continue with senna and prunes daily.    Constipation    Constipation is when a person has fewer than three bowel movements a week, has difficulty having a bowel movement, or has stools that are dry, hard, or larger than normal. Other symptoms can include abdominal pain or bloating. As people grow older, constipation is more common. A low-fiber diet, not taking in enough fluids, and taking certain medicines, including opioid painkillers, may make constipation worse. Treatment varies but may include dietary modifications (more fiber-rich foods), lifestyle modifications, and possible medications.     SEEK IMMEDIATE MEDICAL CARE IF YOU HAVE ANY OF THE FOLLOWING SYMPTOMS: bright red blood in your stool, constipation for longer than 4 days, abdominal or rectal pain, unexplained weight loss, or inability to pass gas.

## 2021-08-18 LAB
-  AMPICILLIN/SULBACTAM: SIGNIFICANT CHANGE UP
-  AMPICILLIN: SIGNIFICANT CHANGE UP
-  CEFAZOLIN: SIGNIFICANT CHANGE UP
-  CEFTRIAXONE: SIGNIFICANT CHANGE UP
-  CIPROFLOXACIN: SIGNIFICANT CHANGE UP
-  ERTAPENEM: SIGNIFICANT CHANGE UP
-  GENTAMICIN: SIGNIFICANT CHANGE UP
-  NITROFURANTOIN: SIGNIFICANT CHANGE UP
-  PIPERACILLIN/TAZOBACTAM: SIGNIFICANT CHANGE UP
-  TOBRAMYCIN: SIGNIFICANT CHANGE UP
-  TRIMETHOPRIM/SULFAMETHOXAZOLE: SIGNIFICANT CHANGE UP
CULTURE RESULTS: SIGNIFICANT CHANGE UP
METHOD TYPE: SIGNIFICANT CHANGE UP
ORGANISM # SPEC MICROSCOPIC CNT: SIGNIFICANT CHANGE UP
ORGANISM # SPEC MICROSCOPIC CNT: SIGNIFICANT CHANGE UP
SPECIMEN SOURCE: SIGNIFICANT CHANGE UP

## 2021-08-19 ENCOUNTER — APPOINTMENT (OUTPATIENT)
Dept: UROLOGY | Facility: CLINIC | Age: 71
End: 2021-08-19
Payer: MEDICARE

## 2021-08-19 VITALS
DIASTOLIC BLOOD PRESSURE: 71 MMHG | HEART RATE: 77 BPM | SYSTOLIC BLOOD PRESSURE: 121 MMHG | OXYGEN SATURATION: 98 % | TEMPERATURE: 98.3 F

## 2021-08-19 PROCEDURE — 99212 OFFICE O/P EST SF 10 MIN: CPT

## 2021-08-19 NOTE — HISTORY OF PRESENT ILLNESS
[FreeTextEntry1] : Patient doing well\par He feels he is getting better and better, and today "feel like can go out and play baseball"\par No fever, n/v\par Mild MARK\par minimal staining on pad today\par Looks well and in no distress\par

## 2021-08-19 NOTE — PHYSICAL EXAM
[Bowel Sounds] : normal bowel sounds [Abdomen Soft] : soft [Abdomen Tenderness] : non-tender [] : no hepato-splenomegaly [Abdomen Mass (___ Cm)] : no abdominal mass palpated [Abdomen Hernia] : no hernia was discovered [Costovertebral Angle Tenderness] : no ~M costovertebral angle tenderness [Urethral Meatus] : meatus normal [Scrotum] : the scrotum was normal [Urinary Bladder Findings] : the bladder was normal on palpation [Epididymis] : the epididymides were normal [Testes Tenderness] : no tenderness of the testes [Testes Mass (___cm)] : there were no testicular masses

## 2021-08-24 NOTE — CONSULT NOTE ADULT - SUBJECTIVE AND OBJECTIVE BOX
Patient is a 71y old  Male who presents with a chief complaint of right inguinal pain and abdominal distention    HPI:  72yo male with PMH of T2DM, HLD, right inguinal hernia s/p repair with mesh 2014, prostate cancer s/p single port RALP 05/10/21 and s/p IR pelvic drain 07/29/21 for pelvic collection presenting to [facility] ED with right inguinal pain. Patient states he had his pelvic IR drain removed on 08/12 and the pain began Friday 08/13. He states the pain is episodic and he can "feel his mesh." States he was constipated until yesterday 08/15 where he had 3BMs. States he has been taking Senna. Denies fevers, chills, nausea or vomiting. Denies dysuria, hematuria or incomplete bladder emptying.    Patient does not recall surgeon who performed hernia repair.     Vital Signs Last 24 Hrs  T(C): 37.4 (16 Aug 2021 12:03), Max: 37.4 (16 Aug 2021 12:03)  T(F): 99.4 (16 Aug 2021 12:03), Max: 99.4 (16 Aug 2021 12:03)  HR: 89 (16 Aug 2021 12:03) (89 - 89)  BP: 129/76 (16 Aug 2021 12:03) (129/76 - 129/76)  BP(mean): --  RR: 17 (16 Aug 2021 12:03) (17 - 17)  SpO2: 97% (16 Aug 2021 12:03) (97% - 97%)  I&O's Summary      PE:  Gen: alert and oriented. no acute distress   Abd: hard, distended, non-tender. surgical incisions healed. pain to palpation over right inguinal region. no hernia palpated   : no suprapubic tenderness.       LABS:                        8.8    6.48  )-----------( 415      ( 16 Aug 2021 12:56 )             28.8           PT/INR - ( 16 Aug 2021 12:56 )   PT: 14.3 sec;   INR: 1.20          PTT - ( 16 Aug 2021 12:56 )  PTT:28.5 sec  Cultures       Patient is a 71y old  Male who presents with a chief complaint of right inguinal pain and abdominal distention    HPI:  72yo male with PMH of T2DM, HLD, right inguinal hernia s/p repair with mesh 2014, prostate cancer s/p single port RALP 05/10/21 and s/p IR pelvic drain 07/29/21 for pelvic collection presenting to [facility] ED with right inguinal pain. Patient states he had his pelvic IR drain removed on 08/12 and the pain began Friday 08/13. He states the pain is episodic and he can "feel his mesh." States he was constipated until yesterday 08/15 where he had 3BMs. States he has been taking Senna. Denies fevers, chills, nausea or vomiting. Denies dysuria, hematuria or incomplete bladder emptying.    Patient does not recall surgeon who performed hernia repair.     Vital Signs Last 24 Hrs  T(C): 37.4 (16 Aug 2021 12:03), Max: 37.4 (16 Aug 2021 12:03)  T(F): 99.4 (16 Aug 2021 12:03), Max: 99.4 (16 Aug 2021 12:03)  HR: 89 (16 Aug 2021 12:03) (89 - 89)  BP: 129/76 (16 Aug 2021 12:03) (129/76 - 129/76)  BP(mean): --  RR: 17 (16 Aug 2021 12:03) (17 - 17)  SpO2: 97% (16 Aug 2021 12:03) (97% - 97%)  I&O's Summary      PE:  Gen: alert and oriented. no acute distress   Abd: hard, distended, non-tender. surgical incisions healed. pain to palpation over right inguinal region. no hernia palpated   : no suprapubic tenderness.       LABS:                        8.8    6.48  )-----------( 415      ( 16 Aug 2021 12:56 )             28.8           PT/INR - ( 16 Aug 2021 12:56 )   PT: 14.3 sec;   INR: 1.20          PTT - ( 16 Aug 2021 12:56 )  PTT:28.5 sec  Cultures    < from: CT Abdomen and Pelvis w/ IV Cont (08.16.21 @ 14:08) >    EXAM:  CT ABDOMEN AND PELVIS IC                          PROCEDURE DATE:  08/16/2021          INTERPRETATION:  CLINICAL INFORMATION: recent abscess s/p IR drain, removed 4 days ago, distended and firm abd r/o recurrent intraabdominal abscess, sbo    COMPARISON: 7/28    PROCEDURE:  CT of the Abdomen and Pelvis was performed with intravenous contrast.  Intravenous contrast: 90 ml Omnipaque 350. 10 ml discarded.  Oral contrast: None.  Sagittal and coronal reformats were performed.    FINDINGS:  LOWER CHEST: Moderate hiatal hernia. Calcified granuloma left lung base.    LIVER: Within normal limits.  BILE DUCTS: Normal caliber.  GALLBLADDER: Cholecystectomy.  SPLEEN: Within normal limits.  PANCREAS: Within normal limits.  ADRENALS: Within normallimits.  KIDNEYS/URETERS: Within normal limits.    BLADDER: Minimally distended. Thickened wall.  REPRODUCTIVE ORGANS: Probable prostatectomy.    BOWEL: No bowel obstruction. Appendix is normal.  PERITONEUM: No ascites. Residual rim-enhancing air-fluid collection in the anterior extraperitoneal pelvis measuring 8 x 3 x 8 cm, significantly decreased in size from prior.  VESSELS: Atherosclerotic changes.  RETROPERITONEUM/LYMPH NODES: No lymphadenopathy.  ABDOMINAL WALL: Small postoperative collection in the right groin stable.  BONES: Within normal limits.    IMPRESSION:  Residual rim-enhancing air-fluid collection in the anterior extraperitoneal pelvis measuring 8 x 3 x 8 cm, although significantly decreased in size from prior.    No new acutefindings. Incidental comments as above.          --- End of Report ---          Thank you for the opportunity to participate in the care of this patient.      REZA REICH MD; Attending Radiologist  This document has been electronically signed. Aug 16 2021  2:18PM    < end of copied text >     DISPLAY PLAN FREE TEXT

## 2021-09-21 ENCOUNTER — APPOINTMENT (OUTPATIENT)
Dept: UROLOGY | Facility: CLINIC | Age: 71
End: 2021-09-21

## 2021-10-14 ENCOUNTER — APPOINTMENT (OUTPATIENT)
Dept: UROLOGY | Facility: CLINIC | Age: 71
End: 2021-10-14
Payer: MEDICARE

## 2021-10-14 VITALS
HEART RATE: 78 BPM | SYSTOLIC BLOOD PRESSURE: 130 MMHG | OXYGEN SATURATION: 99 % | TEMPERATURE: 98.2 F | DIASTOLIC BLOOD PRESSURE: 77 MMHG

## 2021-10-14 PROCEDURE — 99213 OFFICE O/P EST LOW 20 MIN: CPT

## 2021-10-14 NOTE — ASSESSMENT
[FreeTextEntry1] : 70 y/o male who presents for follow up s/p RALP May 2021 & complicated by fluid collection postop in July/August 2021.\par \par PSA today\par Not doing many kegal exercises well. Had patient demonstrate. Noted valsalva with urinary flow. Reinforced importance of doing exercises & correct form of kegals\par PT rx & locations given to patient\par [facility] information given to patient. He will call if he wishes to pursue PDE5i\par \par \par Return to care in 3 months

## 2021-10-14 NOTE — PHYSICAL EXAM
[General Appearance - Well Developed] : well developed [General Appearance - Well Nourished] : well nourished [Normal Appearance] : normal appearance [Well Groomed] : well groomed [General Appearance - In No Acute Distress] : no acute distress [Penis Abnormality] : normal uncircumcised penis [] : no respiratory distress [Respiration, Rhythm And Depth] : normal respiratory rhythm and effort [Exaggerated Use Of Accessory Muscles For Inspiration] : no accessory muscle use [Oriented To Time, Place, And Person] : oriented to person, place, and time [Affect] : the affect was normal [Mood] : the mood was normal [Not Anxious] : not anxious [Normal Station and Gait] : the gait and station were normal for the patient's age

## 2021-10-14 NOTE — HISTORY OF PRESENT ILLNESS
[FreeTextEntry1] : 70 y/o male who presents for follow up s/p RALP May 2021 & complicated by fluid collection postop in July/August 2021.\par \par Feeling well today.\par \par path: prostate adenocarcinoma, Gleson score 7 (4+3), grade group 3. N0\par \par changes depends every 3-4 hours\par + stress incontinence with cough\par + dribbling\par \par very weak erections \par has not been taking tadalafil. did not  rx due to cost\par given information for mail pharmacy  \par

## 2021-10-15 LAB — PSA, POST - PROSTATECTOMY: <0.01 NG/ML

## 2021-10-18 LAB — PSA SERPL-MCNC: <0.01 NG/ML

## 2021-11-04 RX ORDER — TADALAFIL 5 MG/1
5 TABLET ORAL
Qty: 90 | Refills: 3 | Status: ACTIVE | COMMUNITY
Start: 2021-07-06 | End: 1900-01-01

## 2021-11-29 RX ORDER — SILDENAFIL 20 MG/1
20 TABLET ORAL
Qty: 90 | Refills: 2 | Status: ACTIVE | COMMUNITY
Start: 2021-11-29 | End: 1900-01-01

## 2022-01-13 ENCOUNTER — APPOINTMENT (OUTPATIENT)
Dept: UROLOGY | Facility: CLINIC | Age: 72
End: 2022-01-13

## 2022-01-18 NOTE — HISTORY OF PRESENT ILLNESS
[FreeTextEntry1] : CC: History of prostate cancer\par \par 70 y/o male who presents today for follow up s/p RALP May 2021 & complicated by fluid collection postop in July/August 2021.\par \par path: prostate adenocarcinoma, Gleson score 7 (4+3), grade group 3. N0\par \par Last PSA in October was < 0.01\par \par PT prescription given last visit\par \par On Sildenafil for ED post RALP\par

## 2022-04-20 NOTE — PATIENT PROFILE ADULT - NSPRESCRUSEDDRG_GEN_A_NUR
Orders placed. Called pt to discuss and received no answer. Left message to return writer's call.   
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
No

## 2023-08-17 ENCOUNTER — APPOINTMENT (OUTPATIENT)
Dept: UROLOGY | Facility: CLINIC | Age: 73
End: 2023-08-17
Payer: MEDICARE

## 2023-08-17 VITALS
WEIGHT: 172 LBS | TEMPERATURE: 98.5 F | BODY MASS INDEX: 24.62 KG/M2 | HEART RATE: 98 BPM | OXYGEN SATURATION: 98 % | SYSTOLIC BLOOD PRESSURE: 154 MMHG | HEIGHT: 70 IN | DIASTOLIC BLOOD PRESSURE: 84 MMHG

## 2023-08-17 PROCEDURE — 99213 OFFICE O/P EST LOW 20 MIN: CPT

## 2023-08-17 NOTE — HISTORY OF PRESENT ILLNESS
[FreeTextEntry1] :   CC: History of prostate   Doing well today. PSA done on 8/10/23, patient unaware of PSA value  Still reports significant incontinence. Wearing multiple pads/day   Using Viagra for ED, medication dos help We talking about considering ICI, patient no ope to that at this point

## 2023-08-21 LAB — BACTERIA UR CULT: NORMAL

## 2023-09-29 ENCOUNTER — APPOINTMENT (OUTPATIENT)
Dept: UROLOGY | Facility: CLINIC | Age: 73
End: 2023-09-29
Payer: MEDICARE

## 2023-09-29 VITALS
TEMPERATURE: 97.5 F | SYSTOLIC BLOOD PRESSURE: 145 MMHG | DIASTOLIC BLOOD PRESSURE: 80 MMHG | HEART RATE: 65 BPM | OXYGEN SATURATION: 99 % | RESPIRATION RATE: 17 BRPM

## 2023-09-29 PROCEDURE — 51728 CYSTOMETROGRAM W/VP: CPT

## 2023-09-29 PROCEDURE — 51741 ELECTRO-UROFLOWMETRY FIRST: CPT

## 2023-09-29 PROCEDURE — 99215 OFFICE O/P EST HI 40 MIN: CPT | Mod: 25

## 2023-09-29 PROCEDURE — 51784 ANAL/URINARY MUSCLE STUDY: CPT

## 2023-09-29 PROCEDURE — 51797 INTRAABDOMINAL PRESSURE TEST: CPT

## 2023-09-29 RX ORDER — TROSPIUM CHLORIDE 20 MG/1
20 TABLET, FILM COATED ORAL TWICE DAILY
Qty: 30 | Refills: 3 | Status: ACTIVE | COMMUNITY
Start: 2023-09-29 | End: 1900-01-01

## 2023-10-02 ENCOUNTER — NON-APPOINTMENT (OUTPATIENT)
Age: 73
End: 2023-10-02

## 2023-11-01 ENCOUNTER — APPOINTMENT (OUTPATIENT)
Dept: UROLOGY | Facility: CLINIC | Age: 73
End: 2023-11-01
Payer: MEDICARE

## 2023-11-01 VITALS
SYSTOLIC BLOOD PRESSURE: 152 MMHG | HEART RATE: 93 BPM | TEMPERATURE: 97.7 F | OXYGEN SATURATION: 98 % | DIASTOLIC BLOOD PRESSURE: 74 MMHG

## 2023-11-01 DIAGNOSIS — N39.41 URGE INCONTINENCE: ICD-10-CM

## 2023-11-01 PROCEDURE — 99213 OFFICE O/P EST LOW 20 MIN: CPT

## 2023-12-05 ENCOUNTER — APPOINTMENT (OUTPATIENT)
Dept: UROLOGY | Facility: CLINIC | Age: 73
End: 2023-12-05
Payer: MEDICARE

## 2023-12-05 VITALS
DIASTOLIC BLOOD PRESSURE: 72 MMHG | TEMPERATURE: 97.8 F | OXYGEN SATURATION: 98 % | SYSTOLIC BLOOD PRESSURE: 110 MMHG | HEART RATE: 95 BPM

## 2023-12-05 PROCEDURE — 99215 OFFICE O/P EST HI 40 MIN: CPT

## 2024-01-04 ENCOUNTER — APPOINTMENT (OUTPATIENT)
Dept: UROLOGY | Facility: AMBULATORY SURGERY CENTER | Age: 74
End: 2024-01-04
Payer: MEDICARE

## 2024-01-04 PROCEDURE — 99499A: CUSTOM | Mod: NC

## 2024-01-08 ENCOUNTER — APPOINTMENT (OUTPATIENT)
Dept: UROLOGY | Facility: AMBULATORY SURGERY CENTER | Age: 74
End: 2024-01-08

## 2024-01-09 NOTE — ED PROVIDER NOTE - INTERNATIONAL TRAVEL
Your Vitamin D is low and your cholesterol increased slightly. I am going to start you on a once weekly vitamin D supplement. Your cholesterol is not high enough that I need to start a medication for it. Just work on keeping the processed and fast foods to a minimum and increase heart healthy oatmeal and fiber
No

## 2024-02-13 ENCOUNTER — APPOINTMENT (OUTPATIENT)
Dept: UROLOGY | Facility: CLINIC | Age: 74
End: 2024-02-13

## 2024-02-27 ENCOUNTER — APPOINTMENT (OUTPATIENT)
Dept: UROLOGY | Facility: CLINIC | Age: 74
End: 2024-02-27
Payer: MEDICARE

## 2024-02-27 VITALS
HEART RATE: 100 BPM | SYSTOLIC BLOOD PRESSURE: 129 MMHG | OXYGEN SATURATION: 99 % | TEMPERATURE: 97.9 F | DIASTOLIC BLOOD PRESSURE: 83 MMHG

## 2024-02-27 PROCEDURE — 99214 OFFICE O/P EST MOD 30 MIN: CPT

## 2024-02-29 NOTE — HISTORY OF PRESENT ILLNESS
[FreeTextEntry1] : 73-year-old gentleman with a history of prostate cancer status post RALP in 2021. He has been having persistent urinary incontinence which is both related to activity like cough, standing from a sitting position in addition to unaware incontinence. He typically wears 2-3 pads during the day which are fairly damp. He also does have some erectile dysfunction, had tried med therapy with minimal success. He is considering ICI.  The patient is here today to have urodynamics and cystoscopy performed, review results and discuss treatment options. Denies significant change in med/surg history since last office visit.  Cystoscopy -moderate AUS contraction, otherwise unremarkable.  UDS -  Filling/Storage Phase: First sensation 157 mL, First desire 179 mL, Normal desire 257 mL and Cystometric capacity 424 mL. Involuntary contractions were present . Pt demonstrated stress urinary incontinence. Pt did not demonstrate urge urinary incontinence. VLPP 172 cm/H20. Compliance: normal. EMG Activity: normal.  Voiding Phase: Qmax 29 mL/s , Pdet at Qmax 29 cm/H20, Qmax at Pdet 10 mL/s, Pavg 19 cm/H20, Qavg 9 mL/s, voiding time 1:32.7 mm:sec, voided volume 435 mL and post void residual 0 mL. EMG activity was synergic.  Urodynamic Interpretation : Normal bladder sensation. Normal bladder capacity. Patient experiencing detrusor instability. Pt has an appropriate EMG response to involuntary contractions:. The patient has mild stress incontinence. The uroflow rate is normal. The uroflow pattern is normal. The detrusor contractility is normal. The patient has no bladder outlet obstruction. The intravesical voiding pressure is normal. The patient has complete bladder emptying. The spincter activity is normal synergic.  11/1/23  73-year-old gentleman with post prostatectomy incontinence both stress related as well as unaware incontinence. Urodynamic study reveals detrusor overactivity in addition to stress urinary incontinence. Cystoscopy is largely unremarkable.  Had tried an anticholinergic.  No sig difference  He wants to give a little longer  He is not interested in surgical intervention at this point  12/5/23  73-year-old gentleman with post prostatectomy incontinence both stress related as well as unaware incontinence. Urodynamic study reveals detrusor overactivity in addition to stress urinary incontinence. Cystoscopy is largely unremarkable. Anticholinergic not helpful, despite giving it more time.  The patient continues had incontinence with activity, uses 2 pads per day.  2/27/24  73-year-old gentleman with post prostatectomy incontinence both stress related as well as unaware incontinence. Urodynamic study reveals detrusor overactivity in addition to stress urinary incontinence. Cystoscopy is largely unremarkable. Anticholinergic not helpful, despite giving it more time. The patient continues had incontinence with activity, uses 2 pads per day.  He was to have a sling, but had delayed. He is here tpday to have the discussion again regarding a male sling.     never used

## 2024-02-29 NOTE — ASSESSMENT
[FreeTextEntry1] :   Impression/plan: 73-year-old gentleman with post prostatectomy incontinence both stress related as well as unaware incontinence. Urodynamic study reveals detrusor overactivity in addition to stress urinary incontinence.   1. Options for management of post-prostatectomy discussed with the patient at length today. Conservative options including pelvic floor physical therapy and exercises, behavioral modification techniques and bladder training, and Cunningham clamp were discussed at length. Surgical options discussed at length with the patient today as well. Surgical options include periurethral bulking agent, male sling, and artificial urinary sphincter. The patient has opted to move forward with a male sling. RBAPC of the procedure was discussed at length in the office today. This risks associated with a male sling including but not limited to bleeding, infection, mesh erosion, pain, persistent or recurrent incontinence, dysfunctional voiding, urinary retention, and the need for prolonged catheterization or intermittent catheterization were discussed at length in the office today. After the above discussed at length and all questions answered the patient decided to move forward with the procedure.

## 2024-03-01 ENCOUNTER — APPOINTMENT (OUTPATIENT)
Dept: UROLOGY | Facility: CLINIC | Age: 74
End: 2024-03-01

## 2024-03-22 NOTE — ASU PATIENT PROFILE, ADULT - NS PREOP UNDERSTANDS INFO
NPO post midnight clear applejuice , Water Or Black Coffee Until 4 hrs to Sx time Comfortable clothes  photo ID and insurance/ credit card as needed/yes

## 2024-03-22 NOTE — ASU PATIENT PROFILE, ADULT - ABLE TO REACH PT
yes Was told by  Tj from MD Machado's office,  that patient will remain on schedule. Pt expressed concern that he takes Aspirin and Motrin at home./yes

## 2024-03-22 NOTE — ASU PATIENT PROFILE, ADULT - NSICDXPASTSURGICALHX_GEN_ALL_CORE_FT
PAST SURGICAL HISTORY:  History of hernia repair     History of prostate surgery     S/P appendectomy

## 2024-03-22 NOTE — ASU PATIENT PROFILE, ADULT - NSICDXPASTMEDICALHX_GEN_ALL_CORE_FT
PAST MEDICAL HISTORY:  DM (diabetes mellitus)     GERD (gastroesophageal reflux disease)     Heartburn     Hernia     Hyperlipidemia     Prostate cancer

## 2024-03-24 ENCOUNTER — TRANSCRIPTION ENCOUNTER (OUTPATIENT)
Age: 74
End: 2024-03-24

## 2024-03-25 ENCOUNTER — OUTPATIENT (OUTPATIENT)
Dept: OUTPATIENT SERVICES | Facility: HOSPITAL | Age: 74
LOS: 1 days | Discharge: HOME | End: 2024-03-25
Payer: MEDICARE

## 2024-03-25 ENCOUNTER — TRANSCRIPTION ENCOUNTER (OUTPATIENT)
Age: 74
End: 2024-03-25

## 2024-03-25 ENCOUNTER — APPOINTMENT (OUTPATIENT)
Dept: UROLOGY | Facility: AMBULATORY SURGERY CENTER | Age: 74
End: 2024-03-25

## 2024-03-25 ENCOUNTER — NON-APPOINTMENT (OUTPATIENT)
Age: 74
End: 2024-03-25

## 2024-03-25 VITALS
TEMPERATURE: 98 F | DIASTOLIC BLOOD PRESSURE: 74 MMHG | SYSTOLIC BLOOD PRESSURE: 128 MMHG | OXYGEN SATURATION: 98 % | HEART RATE: 66 BPM | RESPIRATION RATE: 16 BRPM

## 2024-03-25 VITALS
TEMPERATURE: 98 F | DIASTOLIC BLOOD PRESSURE: 75 MMHG | HEART RATE: 78 BPM | OXYGEN SATURATION: 98 % | WEIGHT: 158.73 LBS | SYSTOLIC BLOOD PRESSURE: 132 MMHG | RESPIRATION RATE: 18 BRPM | HEIGHT: 70 IN

## 2024-03-25 DIAGNOSIS — Z98.890 OTHER SPECIFIED POSTPROCEDURAL STATES: Chronic | ICD-10-CM

## 2024-03-25 DIAGNOSIS — Z90.49 ACQUIRED ABSENCE OF OTHER SPECIFIED PARTS OF DIGESTIVE TRACT: Chronic | ICD-10-CM

## 2024-03-25 LAB — GLUCOSE BLDC GLUCOMTR-MCNC: 159 MG/DL — HIGH (ref 70–99)

## 2024-03-25 PROCEDURE — 53440 MALE SLING PROCEDURE: CPT

## 2024-03-25 DEVICE — SLING MALE ADVANCE XP: Type: IMPLANTABLE DEVICE | Status: FUNCTIONAL

## 2024-03-25 RX ORDER — METFORMIN HYDROCHLORIDE 850 MG/1
0 TABLET ORAL
Qty: 0 | Refills: 0 | DISCHARGE

## 2024-03-25 RX ORDER — OXYCODONE HYDROCHLORIDE 5 MG/1
5 TABLET ORAL ONCE
Refills: 0 | Status: DISCONTINUED | OUTPATIENT
Start: 2024-03-25 | End: 2024-03-25

## 2024-03-25 RX ORDER — ATORVASTATIN CALCIUM 80 MG/1
1 TABLET, FILM COATED ORAL
Qty: 0 | Refills: 0 | DISCHARGE

## 2024-03-25 RX ORDER — FENTANYL CITRATE 50 UG/ML
25 INJECTION INTRAVENOUS
Refills: 0 | Status: DISCONTINUED | OUTPATIENT
Start: 2024-03-25 | End: 2024-03-25

## 2024-03-25 RX ORDER — ACETAMINOPHEN 500 MG
1000 TABLET ORAL ONCE
Refills: 0 | Status: COMPLETED | OUTPATIENT
Start: 2024-03-25 | End: 2024-03-25

## 2024-03-25 RX ORDER — OMEPRAZOLE 10 MG/1
0 CAPSULE, DELAYED RELEASE ORAL
Qty: 0 | Refills: 0 | DISCHARGE

## 2024-03-25 RX ORDER — SODIUM CHLORIDE 9 MG/ML
500 INJECTION, SOLUTION INTRAVENOUS
Refills: 0 | Status: DISCONTINUED | OUTPATIENT
Start: 2024-03-25 | End: 2024-03-25

## 2024-03-25 RX ORDER — LIDOCAINE HCL 20 MG/ML
10 VIAL (ML) INJECTION ONCE
Refills: 0 | Status: DISCONTINUED | OUTPATIENT
Start: 2024-03-25 | End: 2024-03-25

## 2024-03-25 RX ORDER — IBUPROFEN 200 MG
1 TABLET ORAL
Refills: 0 | DISCHARGE

## 2024-03-25 RX ORDER — CHOLECALCIFEROL (VITAMIN D3) 125 MCG
0 CAPSULE ORAL
Qty: 0 | Refills: 0 | DISCHARGE

## 2024-03-25 RX ORDER — ASPIRIN/CALCIUM CARB/MAGNESIUM 324 MG
1 TABLET ORAL
Refills: 0 | DISCHARGE

## 2024-03-25 RX ADMIN — FENTANYL CITRATE 25 MICROGRAM(S): 50 INJECTION INTRAVENOUS at 16:00

## 2024-03-25 RX ADMIN — OXYCODONE HYDROCHLORIDE 5 MILLIGRAM(S): 5 TABLET ORAL at 18:56

## 2024-03-25 RX ADMIN — Medication 1000 MILLIGRAM(S): at 10:59

## 2024-03-25 RX ADMIN — OXYCODONE HYDROCHLORIDE 5 MILLIGRAM(S): 5 TABLET ORAL at 18:26

## 2024-03-25 RX ADMIN — FENTANYL CITRATE 25 MICROGRAM(S): 50 INJECTION INTRAVENOUS at 15:43

## 2024-03-25 RX ADMIN — FENTANYL CITRATE 25 MICROGRAM(S): 50 INJECTION INTRAVENOUS at 15:48

## 2024-03-25 NOTE — ASU DISCHARGE PLAN (ADULT/PEDIATRIC) - ASU DC SPECIAL INSTRUCTIONSFT
MALE SLING    SURGICAL WOUND: There are often lumps and bumps that can be felt in the perineum, scrotum, and lower abdomen for up to two (2) months or more post operatively. These are of no concern and with time they will soften and disappear.  Any “black and blue” bruising areas will also resolve.  Sometimes the tissue fluid which causes the swelling migrates to the penile skin and can look alarming; with time, all the swelling will eventually subside but may take weeks.  A scrotal support and scrotal fluffs should be worn at all times for the next few weeks, unless bathing, to minimize this swelling. You may apply an ice-pack for 15 minutes out of every hour for the first 24 -36 hours to minimize pain and swelling.    STITCHES: The stitches in the incision will dissolve and fall out by themselves. Sometimes skin stitches may open, allowing a slight gaping of the incision. This is no problem if you keep the area clean.  There is a bluish colored waterproof glue over the incision as well – the glue will peel away and fall off on its own over a couple of weeks.      CATHETER: Some patients are sent home with a Diaz catheter, while others go home urinating on their own. A Diaz catheter continuously drains the urine from the bladder. If you still have a catheter, the nurses will review instructions and care before you go home. For men, you may have a prescription for lidocaine jelly to apply to the tip of your penis, as needed, for catheter related discomfort.     PAIN: You may have some intermittent pain for up to six (6) weeks post operatively. Pain does not signify any problem unless associated with fever, chills, or inability to void.  If you experience any fevers or chills please call immediately as this may be signs of an infection. You may take Tylenol (acetaminophen) 650-975mg and/or Motrin (ibuprofen) 400-600mg, both available over the counter, for pain every 6 hours as needed. Do not exceed 4000mg of Tylenol (acetaminophen) daily. You may alternate these medications such that you take one or the other every 3 hours for around the clock pain coverage. For severe pain, take Percocet 5/325mg every 6 hours. For your convenience, all prescriptions are sent to [facility] pharmacy at [address] [place] [address], on the corner of [address] and [address] [place] [address].    ANTIBIOTICS: You may be given a prescription for an antibiotic, please take this medication as instructed and be sure to complete the entire course. For your convenience, all prescriptions are sent to [facility] pharmacy at [facility] [address], on the corner of [address].    STOOL SOFTENERS: Do not allow yourself to become constipated as straining may cause bleeding. Take stool softeners or a laxative (ex. Miralax, Colace, Senokot, ExLax, etc), available over the counter, if taking Percocet. For your convenience, all prescriptions are sent to [facility] pharmacy at [facility] [address], on the corner of [address].    ANTICOAGULATION: If you are taking any blood thinning medications, please discuss with your urologist prior to restarting these medications unless otherwise specified.    BATHING: You may sponge bath 24 hours after surgery, but minimize water to the surgical incision and drain.    DIET: You may resume your regular diet and regular medication regimen.    ACTIVITY: No heavy lifting or strenuous exercise until you are evaluated at your post-operative appointment. Otherwise, you may return to your usual level of physical activity.    FOLLOW-UP: If you did not already schedule your post-operative appointment, please call your urologist to schedule and follow-up appointment.    CALL YOUR UROLOGIST IF: You have any bleeding that does not stop, inability to void >8 hours, fever over 100.4 F, chills, persistent nausea/vomiting, changes in your incision concerning for infection, or if your pain is not controlled on your discharge pain medications.

## 2024-03-25 NOTE — ASU DISCHARGE PLAN (ADULT/PEDIATRIC) - CARE PROVIDER_API CALL
Artur Machado  Urology  [address], [facility] [place] [address]  [place] [address]  Phone: [phone]  Fax: [phone]  Follow Up Time:

## 2024-03-25 NOTE — ASU DISCHARGE PLAN (ADULT/PEDIATRIC) - NS MD DC FALL RISK RISK
For information on Fall & Injury Prevention, visit: [email or web address] OR  [email or web address] OR  https://www.cdc.gov/steadi/patient.html

## 2024-03-26 PROBLEM — K21.9 GASTRO-ESOPHAGEAL REFLUX DISEASE WITHOUT ESOPHAGITIS: Chronic | Status: ACTIVE | Noted: 2024-03-25

## 2024-03-26 RX ORDER — ACETAMINOPHEN WITH CODEINE 300MG-30MG
1 TABLET ORAL
Qty: 5 | Refills: 0
Start: 2024-03-26

## 2024-03-29 ENCOUNTER — APPOINTMENT (OUTPATIENT)
Dept: UROLOGY | Facility: CLINIC | Age: 74
End: 2024-03-29
Payer: MEDICARE

## 2024-03-29 VITALS
OXYGEN SATURATION: 100 % | TEMPERATURE: 97.2 F | DIASTOLIC BLOOD PRESSURE: 80 MMHG | HEART RATE: 102 BPM | SYSTOLIC BLOOD PRESSURE: 130 MMHG

## 2024-03-29 PROCEDURE — 99024 POSTOP FOLLOW-UP VISIT: CPT

## 2024-03-29 NOTE — HISTORY OF PRESENT ILLNESS
[FreeTextEntry1] : 74 yo Male patient, s/p Sling insertion 03/25. Returns for fill and pull.  Overall feels well. No pain or fever.  Bladder instilled with 250 cc of saline and catheter removed.  Pt able to void.  PVR 0 mL. will follow up in a month.

## 2024-05-07 ENCOUNTER — APPOINTMENT (OUTPATIENT)
Dept: UROLOGY | Facility: CLINIC | Age: 74
End: 2024-05-07

## 2024-05-22 ENCOUNTER — APPOINTMENT (OUTPATIENT)
Dept: UROLOGY | Facility: CLINIC | Age: 74
End: 2024-05-22
Payer: MEDICARE

## 2024-05-22 VITALS
OXYGEN SATURATION: 99 % | SYSTOLIC BLOOD PRESSURE: 122 MMHG | TEMPERATURE: 97.3 F | DIASTOLIC BLOOD PRESSURE: 80 MMHG | HEART RATE: 77 BPM

## 2024-05-22 DIAGNOSIS — N39.3 STRESS INCONTINENCE (FEMALE) (MALE): ICD-10-CM

## 2024-05-22 PROCEDURE — 99024 POSTOP FOLLOW-UP VISIT: CPT

## 2024-05-27 PROBLEM — N39.3 SUI (STRESS URINARY INCONTINENCE), MALE: Status: ACTIVE | Noted: 2021-07-06

## 2024-05-27 NOTE — HISTORY OF PRESENT ILLNESS
[FreeTextEntry1] : 2 months s/p male sling  tried not wearing a pad, no inc (had been using 2-3 pads per day)  overall happy with result  resume all activities f/u 6 months

## 2025-04-01 ENCOUNTER — NON-APPOINTMENT (OUTPATIENT)
Age: 75
End: 2025-04-01

## 2025-04-01 ENCOUNTER — APPOINTMENT (OUTPATIENT)
Dept: UROLOGY | Facility: CLINIC | Age: 75
End: 2025-04-01
Payer: MEDICARE

## 2025-04-01 VITALS
SYSTOLIC BLOOD PRESSURE: 115 MMHG | HEART RATE: 104 BPM | TEMPERATURE: 97.2 F | DIASTOLIC BLOOD PRESSURE: 74 MMHG | OXYGEN SATURATION: 98 %

## 2025-04-01 DIAGNOSIS — N39.3 STRESS INCONTINENCE (FEMALE) (MALE): ICD-10-CM

## 2025-04-01 PROCEDURE — 99213 OFFICE O/P EST LOW 20 MIN: CPT

## 2025-04-15 ENCOUNTER — APPOINTMENT (OUTPATIENT)
Dept: UROLOGY | Facility: CLINIC | Age: 75
End: 2025-04-15
Payer: MEDICARE

## 2025-04-15 VITALS
BODY MASS INDEX: 24.62 KG/M2 | OXYGEN SATURATION: 96 % | HEART RATE: 83 BPM | WEIGHT: 172 LBS | TEMPERATURE: 98.5 F | DIASTOLIC BLOOD PRESSURE: 63 MMHG | SYSTOLIC BLOOD PRESSURE: 122 MMHG | HEIGHT: 70 IN

## 2025-04-15 PROCEDURE — 99213 OFFICE O/P EST LOW 20 MIN: CPT

## 2025-04-16 LAB — PSA SERPL-MCNC: 1.8 NG/ML

## (undated) DEVICE — FOLEY TRAY 16FR 5CC LTX UMETER CLOSED

## (undated) DEVICE — GLV 8 PROTEXIS (WHITE)

## (undated) DEVICE — FOLEY CATH 2-WAY 14FR 5CC SILICONE ELASTOMER LATEX

## (undated) DEVICE — NDL HYPO REGULAR BEVEL 25G X 1.5" (BLUE)

## (undated) DEVICE — LONE STAR RETRACTOR RING 32.5CM X 18.3CM DISP

## (undated) DEVICE — GLV 7.5 PROTEXIS (WHITE)

## (undated) DEVICE — DRAPE TOWEL BLUE 17" X 24"

## (undated) DEVICE — DRAPE TOWEL 1000 SMALL 17" X 11"

## (undated) DEVICE — SUT SILK 2-0 30" SH

## (undated) DEVICE — SUT VICRYL PLUS 2-0 27" SH UNDYED

## (undated) DEVICE — DRSG DERMABOND 0.7ML

## (undated) DEVICE — NDL HYPO SAFE 18G X 1.5" (PINK)

## (undated) DEVICE — SOL IRR POUR H2O 250ML

## (undated) DEVICE — SUCTION YANKAUER NO CONTROL VENT

## (undated) DEVICE — SOL IRR POUR NS 0.9% 500ML

## (undated) DEVICE — SOL IRR BAG NS 0.9% 3000ML

## (undated) DEVICE — WARMING BLANKET UPPER ADULT

## (undated) DEVICE — SUSPENSORY WITH LEG STRAPS LARGE

## (undated) DEVICE — NDL SPINAL 20G X 3.5" (YELLOW)

## (undated) DEVICE — TUBING RANGER FLUID IRRIGATION SET DISP

## (undated) DEVICE — VENODYNE/SCD SLEEVE CALF LARGE

## (undated) DEVICE — BLADE SCALPEL SAFETYLOCK #15

## (undated) DEVICE — SUT MONOCRYL 4-0 27" PS-2 UNDYED

## (undated) DEVICE — PACK GENERAL MINOR

## (undated) DEVICE — SYR LUER LOK 30CC

## (undated) DEVICE — DRAPE INSTRUMENT POUCH 6.75" X 11"

## (undated) DEVICE — GOWN TRIMAX LG

## (undated) DEVICE — DRAPE LAVH 124" X 30" X125"

## (undated) DEVICE — DRSG TEGADERM 4X4.75"

## (undated) DEVICE — DRSG STERISTRIPS 0.5 X 4"

## (undated) DEVICE — BLADE SCALPEL SAFETYLOCK #10

## (undated) DEVICE — TUBING TUR 2 PRONG

## (undated) DEVICE — SUPP ATHLETIC MALE XLG 44-55IN

## (undated) DEVICE — POSITIONER FOAM EGG CRATE ULNAR 2PCS (PINK)